# Patient Record
Sex: MALE | Race: BLACK OR AFRICAN AMERICAN | NOT HISPANIC OR LATINO | URBAN - METROPOLITAN AREA
[De-identification: names, ages, dates, MRNs, and addresses within clinical notes are randomized per-mention and may not be internally consistent; named-entity substitution may affect disease eponyms.]

---

## 2020-11-01 ENCOUNTER — EMERGENCY (EMERGENCY)
Facility: HOSPITAL | Age: 60
LOS: 0 days | Discharge: HOME | End: 2020-11-01
Attending: EMERGENCY MEDICINE | Admitting: EMERGENCY MEDICINE
Payer: MEDICAID

## 2020-11-01 VITALS
HEIGHT: 68 IN | HEART RATE: 82 BPM | OXYGEN SATURATION: 99 % | DIASTOLIC BLOOD PRESSURE: 87 MMHG | SYSTOLIC BLOOD PRESSURE: 159 MMHG | TEMPERATURE: 99 F | RESPIRATION RATE: 20 BRPM | WEIGHT: 203.93 LBS

## 2020-11-01 VITALS
TEMPERATURE: 98 F | SYSTOLIC BLOOD PRESSURE: 144 MMHG | OXYGEN SATURATION: 95 % | HEART RATE: 75 BPM | DIASTOLIC BLOOD PRESSURE: 73 MMHG | RESPIRATION RATE: 20 BRPM

## 2020-11-01 DIAGNOSIS — E78.5 HYPERLIPIDEMIA, UNSPECIFIED: ICD-10-CM

## 2020-11-01 DIAGNOSIS — F17.200 NICOTINE DEPENDENCE, UNSPECIFIED, UNCOMPLICATED: ICD-10-CM

## 2020-11-01 DIAGNOSIS — F41.9 ANXIETY DISORDER, UNSPECIFIED: ICD-10-CM

## 2020-11-01 DIAGNOSIS — I25.10 ATHEROSCLEROTIC HEART DISEASE OF NATIVE CORONARY ARTERY WITHOUT ANGINA PECTORIS: ICD-10-CM

## 2020-11-01 DIAGNOSIS — J44.1 CHRONIC OBSTRUCTIVE PULMONARY DISEASE WITH (ACUTE) EXACERBATION: ICD-10-CM

## 2020-11-01 PROCEDURE — 93010 ELECTROCARDIOGRAM REPORT: CPT

## 2020-11-01 PROCEDURE — 99285 EMERGENCY DEPT VISIT HI MDM: CPT

## 2020-11-01 PROCEDURE — 71045 X-RAY EXAM CHEST 1 VIEW: CPT | Mod: 26

## 2020-11-01 RX ORDER — IPRATROPIUM BROMIDE 0.2 MG/ML
1 SOLUTION, NON-ORAL INHALATION EVERY 6 HOURS
Refills: 0 | Status: DISCONTINUED | OUTPATIENT
Start: 2020-11-01 | End: 2020-11-01

## 2020-11-01 RX ORDER — IPRATROPIUM BROMIDE 0.2 MG/ML
1 SOLUTION, NON-ORAL INHALATION
Refills: 0 | Status: DISCONTINUED | OUTPATIENT
Start: 2020-11-01 | End: 2020-11-01

## 2020-11-01 RX ORDER — CLONAZEPAM 1 MG
0.5 TABLET ORAL ONCE
Refills: 0 | Status: DISCONTINUED | OUTPATIENT
Start: 2020-11-01 | End: 2020-11-01

## 2020-11-01 RX ORDER — ALBUTEROL 90 UG/1
2.5 AEROSOL, METERED ORAL
Refills: 0 | Status: COMPLETED | OUTPATIENT
Start: 2020-11-01 | End: 2020-11-01

## 2020-11-01 RX ORDER — IPRATROPIUM/ALBUTEROL SULFATE 18-103MCG
1 AEROSOL WITH ADAPTER (GRAM) INHALATION
Refills: 0 | Status: DISCONTINUED | OUTPATIENT
Start: 2020-11-01 | End: 2020-11-01

## 2020-11-01 RX ADMIN — Medication 1 PUFF(S): at 04:59

## 2020-11-01 RX ADMIN — ALBUTEROL 2.5 MILLIGRAM(S): 90 AEROSOL, METERED ORAL at 04:14

## 2020-11-01 RX ADMIN — Medication 0.5 MILLIGRAM(S): at 04:14

## 2020-11-01 RX ADMIN — ALBUTEROL 2.5 MILLIGRAM(S): 90 AEROSOL, METERED ORAL at 04:05

## 2020-11-01 RX ADMIN — Medication 60 MILLIGRAM(S): at 04:14

## 2020-11-01 RX ADMIN — ALBUTEROL 2.5 MILLIGRAM(S): 90 AEROSOL, METERED ORAL at 04:11

## 2020-11-01 NOTE — ED PROVIDER NOTE - DATE/TIME 1
- diabetic diet  - continue lantus and ISS, adjust as needed  - glucose is only mildly elevated, no reason to suspect DKA is the cause of his acidosis     01-Nov-2020 05:33

## 2020-11-01 NOTE — ED PROVIDER NOTE - PMH
CAD (coronary artery disease)    COPD (chronic obstructive pulmonary disease)    HLD (hyperlipidemia)

## 2020-11-01 NOTE — ED PROVIDER NOTE - ATTENDING CONTRIBUTION TO CARE
60 y.o male, PMH of COPD, HLD, CAD s/p PCI comes in c/o SOB for 2 days, ran out of his albuterol inhaler 2 days ago. SOB associated with cough. No fever/chills, CP, abdominal pain, n/v/c/d, leg pain or swelling. Pt also reports feeling anxious because group home forgot to give him his Klonopin. On exam, pt in NAD, AAOx3, head NC/AT, CN II-XII intact, lungs wheezing B/L, CV S1S2 regular, abdomen soft/NT/ND/(+)BS, ext (-) edema, motor 5/5x4, sensation intact. Will give albuterol and reevaluate.

## 2020-11-01 NOTE — ED ADULT TRIAGE NOTE - CHIEF COMPLAINT QUOTE
Pt BIBA for adult home for anxiety/ patient states they forgot to give him his anxiety medicaiton Pt BIBA for adult home for SOB and  anxiety/ patient states they forgot to give him his anxiety medicaiton

## 2020-11-01 NOTE — ED PROVIDER NOTE - PATIENT PORTAL LINK FT
You can access the FollowMyHealth Patient Portal offered by [facility] by registering at the following website: [email or web address]. By joining [employer]’s FollowMyHealth portal, you will also be able to view your health information using other applications (apps) compatible with our system.

## 2020-11-01 NOTE — ED ADULT NURSE NOTE - NSIMPLEMENTINTERV_GEN_ALL_ED
Implemented All Fall Risk Interventions:  [place] to call system. Call bell, personal items and telephone within reach. Instruct patient to call for assistance. Room bathroom lighting operational. Non-slip footwear when patient is off stretcher. Physically safe environment: no spills, clutter or unnecessary equipment. Stretcher in lowest position, wheels locked, appropriate side rails in place. Provide visual cue, wrist band, yellow gown, etc. Monitor gait and stability. Monitor for mental status changes and reorient to person, place, and time. Review medications for side effects contributing to fall risk. Reinforce activity limits and safety measures with patient and family.

## 2020-11-01 NOTE — ED PROVIDER NOTE - PHYSICAL EXAMINATION
CONSTITUTIONAL: well-appearing, in NAD  SKIN: Warm dry, normal skin turgor  HEAD: NCAT  EYES: EOMI, PERRLA, no scleral icterus, conjunctiva pink  ENT: normal pharynx with no erythema or exudates  NECK: Supple; non tender. Full ROM.  CARD: RRR, no murmurs.  RESP: b/l diffuse expiratory wheeze.  ABD: soft, non-tender, non-distended, no rebound or guarding.  EXT: Full ROM, no bony tenderness, no pedal edema, no calf tenderness  NEURO: normal motor. normal sensory.  PSYCH: Cooperative, appropriate.

## 2020-11-01 NOTE — ED ADULT NURSE NOTE - OBJECTIVE STATEMENT
Pt presents to ed c/o SOB, anxiety, and cough. Pt did not take anxiety medication and ran out of albuterol inhaler. Pt is from [facility] adult home. Pt is on 2L NC. Pt is not in any distress. Albuterol and ipratropium pump given via MDI. Clonazepam given for anxiety. 97% on 2L NC.

## 2020-11-01 NOTE — ED PROVIDER NOTE - OBJECTIVE STATEMENT
60 y.o COPD, HLD, CAD s/p PCI p/w SOB x 2 days. Pt ran out of his albuterol inhaler 2 days ago and began having SOB. + cough, no cp, no fever, no n/v  no abd pain, no leg swelling, no dizziness.

## 2020-11-01 NOTE — ED PROVIDER NOTE - NS ED ROS FT
Constitutional:  (-) fever, (-) chills, (-) lethargy  Eyes:  (-) eye pain (-) visual changes  ENMT: (-) nasal discharge, (-) sore throat. (-) neck pain or stiffness  Cardiac: (-) chest pain (-) palpitations  Respiratory:  (+) cough (+) respiratory distress.   GI:  (-) nausea (-) vomiting (-) diarrhea (-) abdominal pain.  :  (-) dysuria (-) frequency (-) burning.  MS:  (-) back pain (-) joint pain.  Neuro:  (-) headache (-) numbness (-) tingling (-) focal weakness  Skin:  (-) rash  Except as documented in the HPI,  all other systems are negative

## 2020-11-01 NOTE — ED PROVIDER NOTE - NSFOLLOWUPINSTRUCTIONS_ED_ALL_ED_FT
Chronic Obstructive Pulmonary Disease    Chronic obstructive pulmonary disease (COPD) is a lung condition in which airflow from the lungs is limited. Causes include smoking, secondhand smoke exposure, genetics, or recurrent infections. Take all medicines (inhaled or pills) as directed by your health care provider. Avoid exposure to irritants such as smoke, chemicals, and fumes that aggravate your breathing.    If you are a smoker, the most important thing that you can do is stop smoking. Continuing to smoke will cause further lung damage and breathing trouble. Ask your health care provider for help with quitting smoking.    SEEK IMMEDIATE MEDICAL CARE IF YOU HAVE ANY OF THE FOLLOWING SYMPTOMS: shortness of breath at rest or when talking, bluish discoloration of lips, skin, fever, worsening cough, unexplained chest pain, or lightheadedness/dizziness.    MIYA NEEDS TO TAKE PREDNISONE 50mg ONCE A DAY BEGINNING 11/2/2020 FOR 4 DAYS.

## 2020-11-01 NOTE — ED ADULT NURSE NOTE - CHIEF COMPLAINT QUOTE
Pt BIBA for adult home for SOB and  anxiety/ patient states they forgot to give him his anxiety medicaiton

## 2020-11-01 NOTE — ED PROVIDER NOTE - CARE PLAN
Principal Discharge DX:	COPD exacerbation   Principal Discharge DX:	COPD exacerbation  Secondary Diagnosis:	Anxiety

## 2023-04-14 ENCOUNTER — INPATIENT (INPATIENT)
Facility: HOSPITAL | Age: 63
LOS: 2 days | Discharge: NURSING, REHAB OR LONG-TERM CARE | DRG: 140 | End: 2023-04-17
Attending: HOSPITALIST | Admitting: HOSPITALIST
Payer: MEDICAID

## 2023-04-14 VITALS
HEART RATE: 98 BPM | SYSTOLIC BLOOD PRESSURE: 116 MMHG | RESPIRATION RATE: 20 BRPM | DIASTOLIC BLOOD PRESSURE: 65 MMHG | TEMPERATURE: 100 F | OXYGEN SATURATION: 97 %

## 2023-04-14 LAB
ALBUMIN SERPL ELPH-MCNC: 4.4 G/DL — SIGNIFICANT CHANGE UP (ref 3.5–5.2)
ALP SERPL-CCNC: 83 U/L — SIGNIFICANT CHANGE UP (ref 30–115)
ALT FLD-CCNC: 11 U/L — SIGNIFICANT CHANGE UP (ref 0–41)
ANION GAP SERPL CALC-SCNC: 9 MMOL/L — SIGNIFICANT CHANGE UP (ref 7–14)
AST SERPL-CCNC: 15 U/L — SIGNIFICANT CHANGE UP (ref 0–41)
BASOPHILS # BLD AUTO: 0.03 K/UL — SIGNIFICANT CHANGE UP (ref 0–0.2)
BASOPHILS NFR BLD AUTO: 0.4 % — SIGNIFICANT CHANGE UP (ref 0–1)
BILIRUB SERPL-MCNC: 0.5 MG/DL — SIGNIFICANT CHANGE UP (ref 0.2–1.2)
BUN SERPL-MCNC: 15 MG/DL — SIGNIFICANT CHANGE UP (ref 10–20)
CALCIUM SERPL-MCNC: 9.9 MG/DL — SIGNIFICANT CHANGE UP (ref 8.4–10.5)
CHLORIDE SERPL-SCNC: 96 MMOL/L — LOW (ref 98–110)
CO2 SERPL-SCNC: 31 MMOL/L — SIGNIFICANT CHANGE UP (ref 17–32)
CREAT SERPL-MCNC: 1.4 MG/DL — SIGNIFICANT CHANGE UP (ref 0.7–1.5)
EGFR: 56 ML/MIN/1.73M2 — LOW
EOSINOPHIL # BLD AUTO: 0.04 K/UL — SIGNIFICANT CHANGE UP (ref 0–0.7)
EOSINOPHIL NFR BLD AUTO: 0.5 % — SIGNIFICANT CHANGE UP (ref 0–8)
FLUAV AG NPH QL: SIGNIFICANT CHANGE UP
FLUBV AG NPH QL: SIGNIFICANT CHANGE UP
GLUCOSE SERPL-MCNC: 103 MG/DL — HIGH (ref 70–99)
HCT VFR BLD CALC: 40.7 % — LOW (ref 42–52)
HGB BLD-MCNC: 13.8 G/DL — LOW (ref 14–18)
IMM GRANULOCYTES NFR BLD AUTO: 0.1 % — SIGNIFICANT CHANGE UP (ref 0.1–0.3)
LYMPHOCYTES # BLD AUTO: 2.87 K/UL — SIGNIFICANT CHANGE UP (ref 1.2–3.4)
LYMPHOCYTES # BLD AUTO: 36.9 % — SIGNIFICANT CHANGE UP (ref 20.5–51.1)
MCHC RBC-ENTMCNC: 30.3 PG — SIGNIFICANT CHANGE UP (ref 27–31)
MCHC RBC-ENTMCNC: 33.9 G/DL — SIGNIFICANT CHANGE UP (ref 32–37)
MCV RBC AUTO: 89.5 FL — SIGNIFICANT CHANGE UP (ref 80–94)
MONOCYTES # BLD AUTO: 0.72 K/UL — HIGH (ref 0.1–0.6)
MONOCYTES NFR BLD AUTO: 9.3 % — SIGNIFICANT CHANGE UP (ref 1.7–9.3)
NEUTROPHILS # BLD AUTO: 4.1 K/UL — SIGNIFICANT CHANGE UP (ref 1.4–6.5)
NEUTROPHILS NFR BLD AUTO: 52.8 % — SIGNIFICANT CHANGE UP (ref 42.2–75.2)
NRBC # BLD: 0 /100 WBCS — SIGNIFICANT CHANGE UP (ref 0–0)
PLATELET # BLD AUTO: 195 K/UL — SIGNIFICANT CHANGE UP (ref 130–400)
PMV BLD: 10.9 FL — HIGH (ref 7.4–10.4)
POTASSIUM SERPL-MCNC: 3.7 MMOL/L — SIGNIFICANT CHANGE UP (ref 3.5–5)
POTASSIUM SERPL-SCNC: 3.7 MMOL/L — SIGNIFICANT CHANGE UP (ref 3.5–5)
PROT SERPL-MCNC: 7.2 G/DL — SIGNIFICANT CHANGE UP (ref 6–8)
RBC # BLD: 4.55 M/UL — LOW (ref 4.7–6.1)
RBC # FLD: 12.6 % — SIGNIFICANT CHANGE UP (ref 11.5–14.5)
RSV RNA NPH QL NAA+NON-PROBE: SIGNIFICANT CHANGE UP
SARS-COV-2 RNA SPEC QL NAA+PROBE: SIGNIFICANT CHANGE UP
SODIUM SERPL-SCNC: 136 MMOL/L — SIGNIFICANT CHANGE UP (ref 135–146)
WBC # BLD: 7.77 K/UL — SIGNIFICANT CHANGE UP (ref 4.8–10.8)
WBC # FLD AUTO: 7.77 K/UL — SIGNIFICANT CHANGE UP (ref 4.8–10.8)

## 2023-04-14 PROCEDURE — 71045 X-RAY EXAM CHEST 1 VIEW: CPT | Mod: 26

## 2023-04-14 PROCEDURE — 99285 EMERGENCY DEPT VISIT HI MDM: CPT

## 2023-04-14 RX ORDER — IPRATROPIUM/ALBUTEROL SULFATE 18-103MCG
3 AEROSOL WITH ADAPTER (GRAM) INHALATION
Refills: 0 | Status: COMPLETED | OUTPATIENT
Start: 2023-04-14 | End: 2023-04-14

## 2023-04-14 RX ORDER — ACETAMINOPHEN 500 MG
650 TABLET ORAL ONCE
Refills: 0 | Status: COMPLETED | OUTPATIENT
Start: 2023-04-14 | End: 2023-04-14

## 2023-04-14 RX ADMIN — Medication 3 MILLILITER(S): at 23:00

## 2023-04-14 RX ADMIN — Medication 125 MILLIGRAM(S): at 22:45

## 2023-04-14 RX ADMIN — Medication 3 MILLILITER(S): at 22:23

## 2023-04-14 RX ADMIN — Medication 650 MILLIGRAM(S): at 23:00

## 2023-04-14 RX ADMIN — Medication 650 MILLIGRAM(S): at 22:22

## 2023-04-14 RX ADMIN — Medication 3 MILLILITER(S): at 22:45

## 2023-04-14 RX ADMIN — Medication 3 MILLILITER(S): at 23:32

## 2023-04-14 NOTE — ED ADULT NURSE NOTE - OBJECTIVE STATEMENT
Patient AOx3, BIBA from NH, c/o weakness, sob, congestion, difficulty breathing and loss of appetite.

## 2023-04-14 NOTE — ED ADULT TRIAGE NOTE - CHIEF COMPLAINT QUOTE
Patient sent from assisted living facility in NAD a+ox3 for generalized weakness and SOB X few days. As per EMS on arrival pt was on floor next to bed pt reports slipped off bed- denies hitting head, LOC, AC use

## 2023-04-14 NOTE — ED ADULT NURSE NOTE - NSSUHOSCREENINGYN_ED_ALL_ED
NURSING PROGRESS NOTE: Patient ambulated to ACC at 1320 for Port Flush.  Right upper chest Power Port identified  Per arm band and palpation points.  Accessed  & flushed per policy without incident.  Declined AVS states next appointment available per My Chart.  Discharged home at 1350.  OSMEL Landry   Yes - the patient is able to be screened

## 2023-04-14 NOTE — ED PROVIDER NOTE - NS ED ATTENDING STATEMENT MOD
This was a shared visit with the GOLD. I reviewed and verified the documentation and independently performed the documented:
abnormal

## 2023-04-14 NOTE — ED PROVIDER NOTE - CLINICAL SUMMARY MEDICAL DECISION MAKING FREE TEXT BOX
Patient presented with complaints of generalized weakness.  Was found to be febrile in the ED.  Required EKG, labs imaging, meds.  Will be admitted for further management.

## 2023-04-14 NOTE — ED PROVIDER NOTE - ATTENDING APP SHARED VISIT CONTRIBUTION OF CARE
I personally evaluated the patient. I reviewed the Resident’s or Physician Assistant’s note (as assigned above), and agree with the findings and plan except as documented in my note.  62-year-old male with past medical history of COPD not on home O2, HLD, CAD status post PCI, anemia, bipolar disorder, GERD, gout presents with complaints of several days of generalized weakness, fever and associated shortness of breath.  Patient denies chest pain, nausea, vomiting, dizziness or LOC.  VSS, non toxic appearing, NAD, Head NCAT, MMM, neck supple, normal ROM, no JVD, normal s1s2, lungs with mild expiratory wheezes diffusely with normal work of breathing, abd s/nt/nd, no guarding or rebound, extremities wnl, AAO x 3, GCS 15, neuro grossly normal. No acute skin lesions. Plan is EKG, labs, imaging, meds and reassess.

## 2023-04-14 NOTE — ED PROVIDER NOTE - OBJECTIVE STATEMENT
62 year old male with pmhx, hld, gerd, gout, anemia, bipolar, and cad with stents presents to ed with fever, weakness, and sob x 2 days. Pt denies chest pain, abd, pain, nausea, vomiting, diarrhea, sick contacts or recent travel.

## 2023-04-14 NOTE — ED PROVIDER NOTE - PHYSICAL EXAMINATION
CONST: Well appearing in NAD  EYES: PERRL, EOMI, Sclera and conjunctiva clear.   ENT: No nasal discharge. Oropharynx normal appearing  NECK: Non-tender, no meningeal signs. normal ROM. supple   CARD: Normal S1 S2; Normal rate and rhythm  RESP: Equal BS B/L, wheezing bilaterally, No distress  GI: Soft, non-tender, non-distended. no cva tenderness. normal BS  MS: Normal ROM in all extremities. No midline spinal tenderness. pulses 2 +. no calf tenderness or swelling  SKIN: Warm, dry, no acute rashes. Good turgor  NEURO: A&Ox3, No focal deficits.

## 2023-04-15 DIAGNOSIS — J44.1 CHRONIC OBSTRUCTIVE PULMONARY DISEASE WITH (ACUTE) EXACERBATION: ICD-10-CM

## 2023-04-15 PROBLEM — E78.5 HYPERLIPIDEMIA, UNSPECIFIED: Chronic | Status: ACTIVE | Noted: 2020-11-01

## 2023-04-15 PROBLEM — J44.9 CHRONIC OBSTRUCTIVE PULMONARY DISEASE, UNSPECIFIED: Chronic | Status: ACTIVE | Noted: 2020-11-01

## 2023-04-15 PROBLEM — I25.10 ATHEROSCLEROTIC HEART DISEASE OF NATIVE CORONARY ARTERY WITHOUT ANGINA PECTORIS: Chronic | Status: ACTIVE | Noted: 2020-11-01

## 2023-04-15 LAB
APPEARANCE UR: CLEAR — SIGNIFICANT CHANGE UP
BASE EXCESS BLDV CALC-SCNC: 5.9 MMOL/L — HIGH (ref -2–3)
BILIRUB UR-MCNC: NEGATIVE — SIGNIFICANT CHANGE UP
CA-I SERPL-SCNC: 1.23 MMOL/L — SIGNIFICANT CHANGE UP (ref 1.15–1.33)
CO2 BLDV-SCNC: 34.7 MMOL/L — HIGH (ref 22–26)
COLOR SPEC: SIGNIFICANT CHANGE UP
DIFF PNL FLD: NEGATIVE — SIGNIFICANT CHANGE UP
GAS PNL BLDV: 133 MMOL/L — LOW (ref 136–145)
GAS PNL BLDV: SIGNIFICANT CHANGE UP
GAS PNL BLDV: SIGNIFICANT CHANGE UP
GLUCOSE UR QL: NEGATIVE — SIGNIFICANT CHANGE UP
HCT VFR BLDA CALC: 42 % — SIGNIFICANT CHANGE UP (ref 39–51)
HCV AB S/CO SERPL IA: 0.1 S/CO — SIGNIFICANT CHANGE UP (ref 0–0.99)
HCV AB SERPL-IMP: SIGNIFICANT CHANGE UP
HGB BLD CALC-MCNC: 14 G/DL — SIGNIFICANT CHANGE UP (ref 12.6–17.4)
KETONES UR-MCNC: NEGATIVE — SIGNIFICANT CHANGE UP
LACTATE BLDV-MCNC: 1.1 MMOL/L — SIGNIFICANT CHANGE UP (ref 0.5–2)
LEUKOCYTE ESTERASE UR-ACNC: NEGATIVE — SIGNIFICANT CHANGE UP
MRSA PCR RESULT.: NEGATIVE — SIGNIFICANT CHANGE UP
NITRITE UR-MCNC: NEGATIVE — SIGNIFICANT CHANGE UP
PCO2 BLDV: 57 MMHG — HIGH (ref 42–55)
PH BLDV: 7.37 — SIGNIFICANT CHANGE UP (ref 7.32–7.43)
PH UR: 8 — SIGNIFICANT CHANGE UP (ref 5–8)
PO2 BLDV: 55 MMHG — SIGNIFICANT CHANGE UP
POTASSIUM BLDV-SCNC: 3.4 MMOL/L — LOW (ref 3.5–5.1)
PROT UR-MCNC: NEGATIVE — SIGNIFICANT CHANGE UP
SP GR SPEC: 1.01 — SIGNIFICANT CHANGE UP (ref 1.01–1.03)
TROPONIN T SERPL-MCNC: 0.04 NG/ML — CRITICAL HIGH
TROPONIN T SERPL-MCNC: <0.01 NG/ML — SIGNIFICANT CHANGE UP
UROBILINOGEN FLD QL: SIGNIFICANT CHANGE UP

## 2023-04-15 PROCEDURE — 83735 ASSAY OF MAGNESIUM: CPT

## 2023-04-15 PROCEDURE — 87086 URINE CULTURE/COLONY COUNT: CPT

## 2023-04-15 PROCEDURE — 83036 HEMOGLOBIN GLYCOSYLATED A1C: CPT

## 2023-04-15 PROCEDURE — 80061 LIPID PANEL: CPT

## 2023-04-15 PROCEDURE — 80053 COMPREHEN METABOLIC PANEL: CPT

## 2023-04-15 PROCEDURE — 93306 TTE W/DOPPLER COMPLETE: CPT | Mod: 26

## 2023-04-15 PROCEDURE — 36415 COLL VENOUS BLD VENIPUNCTURE: CPT

## 2023-04-15 PROCEDURE — 87640 STAPH A DNA AMP PROBE: CPT

## 2023-04-15 PROCEDURE — 0225U NFCT DS DNA&RNA 21 SARSCOV2: CPT

## 2023-04-15 PROCEDURE — 97162 PT EVAL MOD COMPLEX 30 MIN: CPT | Mod: GP

## 2023-04-15 PROCEDURE — 85025 COMPLETE CBC W/AUTO DIFF WBC: CPT

## 2023-04-15 PROCEDURE — 86803 HEPATITIS C AB TEST: CPT

## 2023-04-15 PROCEDURE — 84443 ASSAY THYROID STIM HORMONE: CPT

## 2023-04-15 PROCEDURE — 99223 1ST HOSP IP/OBS HIGH 75: CPT

## 2023-04-15 PROCEDURE — 81003 URINALYSIS AUTO W/O SCOPE: CPT

## 2023-04-15 PROCEDURE — 93306 TTE W/DOPPLER COMPLETE: CPT

## 2023-04-15 PROCEDURE — 84484 ASSAY OF TROPONIN QUANT: CPT

## 2023-04-15 PROCEDURE — 87641 MR-STAPH DNA AMP PROBE: CPT

## 2023-04-15 RX ORDER — OXYBUTYNIN CHLORIDE 5 MG
1 TABLET ORAL
Refills: 0 | DISCHARGE

## 2023-04-15 RX ORDER — ACETAMINOPHEN 500 MG
650 TABLET ORAL EVERY 6 HOURS
Refills: 0 | Status: ACTIVE | OUTPATIENT
Start: 2023-04-15 | End: 2024-03-13

## 2023-04-15 RX ORDER — OMEGA-3 ACID ETHYL ESTERS 1 G
1 CAPSULE ORAL
Refills: 0 | DISCHARGE

## 2023-04-15 RX ORDER — OXYCODONE AND ACETAMINOPHEN 5; 325 MG/1; MG/1
1 TABLET ORAL
Refills: 0 | DISCHARGE

## 2023-04-15 RX ORDER — RISPERIDONE 4 MG/1
1 TABLET ORAL
Refills: 0 | DISCHARGE

## 2023-04-15 RX ORDER — CLONAZEPAM 1 MG
1 TABLET ORAL
Refills: 0 | Status: ACTIVE | OUTPATIENT
Start: 2023-04-15 | End: 2023-04-22

## 2023-04-15 RX ORDER — AZITHROMYCIN 500 MG/1
500 TABLET, FILM COATED ORAL ONCE
Refills: 0 | Status: COMPLETED | OUTPATIENT
Start: 2023-04-15 | End: 2023-04-15

## 2023-04-15 RX ORDER — HEPARIN SODIUM 5000 [USP'U]/ML
5000 INJECTION INTRAVENOUS; SUBCUTANEOUS EVERY 8 HOURS
Refills: 0 | Status: ACTIVE | OUTPATIENT
Start: 2023-04-15 | End: 2024-03-13

## 2023-04-15 RX ORDER — LANOLIN ALCOHOL/MO/W.PET/CERES
3 CREAM (GRAM) TOPICAL AT BEDTIME
Refills: 0 | Status: ACTIVE | OUTPATIENT
Start: 2023-04-15 | End: 2024-03-13

## 2023-04-15 RX ORDER — METFORMIN HYDROCHLORIDE 850 MG/1
0.5 TABLET ORAL
Refills: 0 | DISCHARGE

## 2023-04-15 RX ORDER — FINASTERIDE 5 MG/1
1 TABLET, FILM COATED ORAL
Refills: 0 | DISCHARGE

## 2023-04-15 RX ORDER — ASPIRIN/CALCIUM CARB/MAGNESIUM 324 MG
1 TABLET ORAL
Refills: 0 | DISCHARGE

## 2023-04-15 RX ORDER — ATORVASTATIN CALCIUM 80 MG/1
1 TABLET, FILM COATED ORAL
Refills: 0 | DISCHARGE

## 2023-04-15 RX ORDER — TAMSULOSIN HYDROCHLORIDE 0.4 MG/1
1 CAPSULE ORAL
Refills: 0 | DISCHARGE

## 2023-04-15 RX ORDER — TRIAMTERENE/HYDROCHLOROTHIAZID 75 MG-50MG
1 TABLET ORAL DAILY
Refills: 0 | Status: ACTIVE | OUTPATIENT
Start: 2023-04-15 | End: 2024-03-13

## 2023-04-15 RX ORDER — TAMSULOSIN HYDROCHLORIDE 0.4 MG/1
0.4 CAPSULE ORAL AT BEDTIME
Refills: 0 | Status: ACTIVE | OUTPATIENT
Start: 2023-04-15 | End: 2024-03-13

## 2023-04-15 RX ORDER — FOLIC ACID 0.8 MG
1 TABLET ORAL
Refills: 0 | DISCHARGE

## 2023-04-15 RX ORDER — ONDANSETRON 8 MG/1
4 TABLET, FILM COATED ORAL EVERY 8 HOURS
Refills: 0 | Status: ACTIVE | OUTPATIENT
Start: 2023-04-15 | End: 2024-03-13

## 2023-04-15 RX ORDER — ALENDRONATE SODIUM 70 MG/1
1 TABLET ORAL
Refills: 0 | DISCHARGE

## 2023-04-15 RX ORDER — SENNA PLUS 8.6 MG/1
2 TABLET ORAL
Refills: 0 | DISCHARGE

## 2023-04-15 RX ORDER — AZITHROMYCIN 500 MG/1
500 TABLET, FILM COATED ORAL DAILY
Refills: 0 | Status: DISCONTINUED | OUTPATIENT
Start: 2023-04-15 | End: 2023-04-16

## 2023-04-15 RX ORDER — ASPIRIN/CALCIUM CARB/MAGNESIUM 324 MG
81 TABLET ORAL DAILY
Refills: 0 | Status: ACTIVE | OUTPATIENT
Start: 2023-04-15 | End: 2024-03-13

## 2023-04-15 RX ORDER — FOLIC ACID 0.8 MG
1 TABLET ORAL DAILY
Refills: 0 | Status: ACTIVE | OUTPATIENT
Start: 2023-04-15 | End: 2024-03-13

## 2023-04-15 RX ORDER — METOPROLOL TARTRATE 50 MG
50 TABLET ORAL DAILY
Refills: 0 | Status: ACTIVE | OUTPATIENT
Start: 2023-04-15 | End: 2024-03-13

## 2023-04-15 RX ORDER — BNT162B2 0.23 MG/2.25ML
0.3 INJECTION, SUSPENSION INTRAMUSCULAR ONCE
Refills: 0 | Status: ACTIVE | OUTPATIENT
Start: 2023-04-15 | End: 2024-03-13

## 2023-04-15 RX ORDER — CLONAZEPAM 1 MG
1 TABLET ORAL
Refills: 0 | DISCHARGE

## 2023-04-15 RX ORDER — POLYETHYLENE GLYCOL 3350 17 G/17G
17 POWDER, FOR SOLUTION ORAL DAILY
Refills: 0 | Status: ACTIVE | OUTPATIENT
Start: 2023-04-15 | End: 2024-03-13

## 2023-04-15 RX ORDER — FINASTERIDE 5 MG/1
5 TABLET, FILM COATED ORAL DAILY
Refills: 0 | Status: ACTIVE | OUTPATIENT
Start: 2023-04-15 | End: 2024-03-13

## 2023-04-15 RX ORDER — OXYBUTYNIN CHLORIDE 5 MG
5 TABLET ORAL
Refills: 0 | Status: ACTIVE | OUTPATIENT
Start: 2023-04-15 | End: 2024-03-13

## 2023-04-15 RX ORDER — FENOFIBRATE,MICRONIZED 130 MG
1 CAPSULE ORAL
Refills: 0 | DISCHARGE

## 2023-04-15 RX ORDER — RISPERIDONE 4 MG/1
2 TABLET ORAL
Refills: 0 | Status: ACTIVE | OUTPATIENT
Start: 2023-04-15 | End: 2024-03-13

## 2023-04-15 RX ORDER — ATORVASTATIN CALCIUM 80 MG/1
20 TABLET, FILM COATED ORAL AT BEDTIME
Refills: 0 | Status: DISCONTINUED | OUTPATIENT
Start: 2023-04-15 | End: 2023-04-16

## 2023-04-15 RX ORDER — FENOFIBRATE,MICRONIZED 130 MG
48 CAPSULE ORAL DAILY
Refills: 0 | Status: ACTIVE | OUTPATIENT
Start: 2023-04-15 | End: 2024-03-13

## 2023-04-15 RX ORDER — POLYETHYLENE GLYCOL 3350 17 G/17G
17 POWDER, FOR SOLUTION ORAL
Refills: 0 | DISCHARGE

## 2023-04-15 RX ORDER — TRIAMTERENE/HYDROCHLOROTHIAZID 75 MG-50MG
1 TABLET ORAL
Refills: 0 | DISCHARGE

## 2023-04-15 RX ORDER — METOPROLOL TARTRATE 50 MG
1 TABLET ORAL
Refills: 0 | DISCHARGE

## 2023-04-15 RX ADMIN — Medication 10 MILLIGRAM(S): at 13:21

## 2023-04-15 RX ADMIN — Medication 1 TABLET(S): at 05:33

## 2023-04-15 RX ADMIN — AZITHROMYCIN 500 MILLIGRAM(S): 500 TABLET, FILM COATED ORAL at 13:21

## 2023-04-15 RX ADMIN — Medication 50 MILLIGRAM(S): at 05:33

## 2023-04-15 RX ADMIN — FINASTERIDE 5 MILLIGRAM(S): 5 TABLET, FILM COATED ORAL at 13:26

## 2023-04-15 RX ADMIN — RISPERIDONE 2 MILLIGRAM(S): 4 TABLET ORAL at 17:33

## 2023-04-15 RX ADMIN — HEPARIN SODIUM 5000 UNIT(S): 5000 INJECTION INTRAVENOUS; SUBCUTANEOUS at 05:34

## 2023-04-15 RX ADMIN — Medication 81 MILLIGRAM(S): at 13:24

## 2023-04-15 RX ADMIN — HEPARIN SODIUM 5000 UNIT(S): 5000 INJECTION INTRAVENOUS; SUBCUTANEOUS at 21:31

## 2023-04-15 RX ADMIN — HEPARIN SODIUM 5000 UNIT(S): 5000 INJECTION INTRAVENOUS; SUBCUTANEOUS at 13:27

## 2023-04-15 RX ADMIN — Medication 48 MILLIGRAM(S): at 13:22

## 2023-04-15 RX ADMIN — ATORVASTATIN CALCIUM 20 MILLIGRAM(S): 80 TABLET, FILM COATED ORAL at 21:32

## 2023-04-15 RX ADMIN — Medication 25 MILLIGRAM(S): at 05:32

## 2023-04-15 RX ADMIN — AZITHROMYCIN 255 MILLIGRAM(S): 500 TABLET, FILM COATED ORAL at 01:44

## 2023-04-15 RX ADMIN — Medication 25 MILLIGRAM(S): at 17:33

## 2023-04-15 RX ADMIN — Medication 5 MILLIGRAM(S): at 17:33

## 2023-04-15 RX ADMIN — Medication 40 MILLIGRAM(S): at 13:21

## 2023-04-15 RX ADMIN — Medication 5 MILLIGRAM(S): at 05:33

## 2023-04-15 RX ADMIN — Medication 1 MILLIGRAM(S): at 13:24

## 2023-04-15 RX ADMIN — TAMSULOSIN HYDROCHLORIDE 0.4 MILLIGRAM(S): 0.4 CAPSULE ORAL at 21:32

## 2023-04-15 RX ADMIN — RISPERIDONE 2 MILLIGRAM(S): 4 TABLET ORAL at 05:32

## 2023-04-15 NOTE — H&P ADULT - NSICDXPASTMEDICALHX_GEN_ALL_CORE_FT
PAST MEDICAL HISTORY:  CAD (coronary artery disease)     COPD (chronic obstructive pulmonary disease)     HLD (hyperlipidemia)

## 2023-04-15 NOTE — H&P ADULT - ATTENDING COMMENTS
62-year-old male with past medical history of COPD not on home O2, HLD, CAD status post PCI, anemia, bipolar disorder, GERD, gout presents with complaints of several days of generalized weakness. He stated that he started to feel short of breath on exertion about 2 days ago and reports subjective fever.    #Acute Hypoxic Respiratory Failure, resolved  #Mild COPD exacerbation  #Suspected viral illness  #Weakness  Pt symptoms have resolved after getting dose of IV steroids overnight + duonebs  Initially was on 3L O2 NC on admission, but now on RA saturating 95%  - Cont PO prednisone 40mg qD x5 more days  - Cont duonebs for now  - Cont azithromycin 500 x3d  - PT Eval  - DC Planning, pt is from [facility] and they are able to accept pt back on monday    #HTN/HLD  #CAD sp PCI  #Elevated troponins, likely demand ischemia  - Monitor trop  - Cont lipitor 20mg qHs  - Cont fenofibrate 48 qD  - Cont metoprolol ER 50mg qD  - Cont triamterene-HCTZ 37.5-25 qD    #Bipolar disorder  - Cont risperdal 2 BID  - Cont paxil 10mg qD    #Back pain  - On percocet and lyrica    #BPH  - Cont flomax and finasteride    DVT PPX heparin    #Progress Note Handoff  Pending (specify): DC on Monday  Family discussion: dw pt regarding tx for dyspnea  Disposition: [facility] ( [facility])

## 2023-04-15 NOTE — H&P ADULT - NSHPPHYSICALEXAM_GEN_ALL_CORE
GENERAL: NAD, speaks in full sentences, no signs of respiratory distress  HEAD:  Atraumatic, Normocephalic  EYES: EOMI, PERRLA, anicteric sclera  NECK: Supple, No JVD  CHEST/LUNG: Clear to auscultation bilaterally; No wheeze; No crackles; No accessory muscles used  HEART: Regular rate and rhythm; No murmurs;   ABDOMEN: Soft, Nontender, Nondistended; Bowel sounds present; No guarding  EXTREMITIES:  2+ Peripheral Pulses, No cyanosis or edema  PSYCH: AAOx3  NEUROLOGY: non-focal  SKIN: No rashes or lesions GENERAL: NAD, speaks in full sentences, no signs of respiratory distress  HEAD:  Atraumatic, Normocephalic  EYES: EOMI, PERRLA, anicteric sclera  NECK: Supple, No JVD  CHEST/LUNG: Clear to auscultation bilaterally; No wheeze; No accessory muscles used  HEART: Regular rate and rhythm; No murmurs;   ABDOMEN: Soft, Nontender, Nondistended; Bowel sounds present; No guarding  EXTREMITIES:  2+ Peripheral Pulses, No cyanosis or edema  PSYCH: AAOx3  NEUROLOGY: non-focal  SKIN: No rashes or lesions

## 2023-04-15 NOTE — PATIENT PROFILE ADULT - FALL HARM RISK - HARM RISK INTERVENTIONS

## 2023-04-15 NOTE — H&P ADULT - ASSESSMENT
62-year-old male with past medical history of COPD not on home O2, HLD, CAD status post PCI, anemia, bipolar disorder, GERD, gout presents with complaints of several days of generalized weakness, fever and associated shortness of breath.  Patient denies chest pain, nausea, vomiting, dizziness, loc, sick contact or recent travel     In the ED, /65, HR 98, RR 20, T 100.1, SpO2 97% on 3L NC . EKG showed NSR. CXR unremarkable. Labs were significant for Hgb 13.8, Trop 0.04. Patient received azithromycin x1, duoneb and solumedrol 125 x1    Patient is being admitted     #Generalized Weakness    #Acute hypoxemic Respiratory Failure 2/2 COPD exacerbation  - CXR (4.15.23): unremarkable   - Pulse ox q8 hrs and Nebs q 6 hrs PRN  - s/p Solumedrol 125mg IV then 40 q 6 with plan for taper  - Supplemental Oxygen PRN, titrate PRN to wean patient to baseline O2 status. COPD patient will keep SPO2 goal 88-92% .   On home oxygen ?  f/u MRSA PCR, RVP, Sputum cx  Pulmonary consult    62-year-old male with past medical history of COPD not on home O2, HLD, CAD status post PCI, anemia, bipolar disorder, GERD, gout presents with complaints of several days of generalized weakness. He stated that he started to feel short of breath on exertion about 2 days ago and reports subjective fever. This prompted his presentation tot he ED.   Patient denies chest pain, nausea, cough, vomiting, dizziness, loc, sick contact or recent travel. He ambulates with a walker and lives in assisted living. He quit smoking a month ago.     In the ED, /65, HR 98, RR 20, T 100.1, SpO2 97% on 3L NC . EKG showed NSR. CXR unremarkable. Labs were significant for Hgb 13.8, Trop 0.04. Patient received azithromycin x1, duoneb and solumedrol 125 x1    #Generalized Weakness  #SIRS on admission  - febrile, no leukocytosis   - CXR unremarkable.  - No signs fluid overload on PE  - f/u UA, UCx, Blood Cx, TSH   - obtain PT/OT   - Electrolyte wnl, replete as needed    #Acute hypoxemic Respiratory Failure 2/2 suspected COPD exacerbation  - not on home oxygen   - CXR (4.15.23): unremarkable   - Pulse ox q8 hrs and Nebs q 6 hrs PRN  - s/p Solumedrol 125mg IV then 40 q 6 with plan for taper  - start azithromycin  - Supplemental Oxygen PRN, titrate PRN to wean patient to baseline O2 status. COPD patient will keep SPO2 goal 88-92% .   - f/u MRSA PCR, RVP, Sputum cx    #Troponemia likely 2/2 demand ischemia   - Tropnin 0.04  - no active chest pain. EKG NSR   - trend troponin and check TTE    #CAD status post PCI  #HTN  # HLD  - c/w home meds     #bipolar disorder  - c/w home meds     #BPH  -c/w home meds     #GERD  - c/w home meds

## 2023-04-15 NOTE — H&P ADULT - HISTORY OF PRESENT ILLNESS
62-year-old male with past medical history of COPD not on home O2, HLD, CAD status post PCI, anemia, bipolar disorder, GERD, gout presents with complaints of several days of generalized weakness, fever and associated shortness of breath.  Patient denies chest pain, nausea, vomiting, dizziness, loc, sick contact or recent travel     In the ED, /65, HR 98, RR 20, T 100.1, SpO2 97% on 3L NC . EKG showed NSR. CXR unremarkable. Labs were significant for Hgb 13.8, Trop 0.04. Patient received azithromycin x1, duoneb and solumedrol 125 x1    Patient is being admitted for further monitoring and management 62-year-old male with past medical history of COPD not on home O2, HLD, CAD status post PCI, anemia, bipolar disorder, GERD, gout presents with complaints of several days of generalized weakness. He stated that he started to feel short of breath on exertion about 2 days ago and reports subjective fever. This prompted his presentation tot he ED.   Patient denies chest pain, nausea, cough, vomiting, dizziness, loc, sick contact or recent travel. He ambulates with a walker and lives in assisted living. He quit smoking a month ago.     In the ED, /65, HR 98, RR 20, T 100.1, SpO2 97% on 3L NC . EKG showed NSR. CXR unremarkable. Labs were significant for Hgb 13.8, Trop 0.04. Patient received azithromycin x1, duoneb and solumedrol 125 x1    Patient is being admitted for further monitoring and management

## 2023-04-16 LAB
ALBUMIN SERPL ELPH-MCNC: 4.2 G/DL — SIGNIFICANT CHANGE UP (ref 3.5–5.2)
ALP SERPL-CCNC: 80 U/L — SIGNIFICANT CHANGE UP (ref 30–115)
ALT FLD-CCNC: 19 U/L — SIGNIFICANT CHANGE UP (ref 0–41)
ANION GAP SERPL CALC-SCNC: 10 MMOL/L — SIGNIFICANT CHANGE UP (ref 7–14)
AST SERPL-CCNC: 24 U/L — SIGNIFICANT CHANGE UP (ref 0–41)
BASOPHILS # BLD AUTO: 0.01 K/UL — SIGNIFICANT CHANGE UP (ref 0–0.2)
BASOPHILS NFR BLD AUTO: 0.1 % — SIGNIFICANT CHANGE UP (ref 0–1)
BILIRUB SERPL-MCNC: 0.5 MG/DL — SIGNIFICANT CHANGE UP (ref 0.2–1.2)
BUN SERPL-MCNC: 19 MG/DL — SIGNIFICANT CHANGE UP (ref 10–20)
CALCIUM SERPL-MCNC: 9.5 MG/DL — SIGNIFICANT CHANGE UP (ref 8.4–10.5)
CHLORIDE SERPL-SCNC: 100 MMOL/L — SIGNIFICANT CHANGE UP (ref 98–110)
CHOLEST SERPL-MCNC: 150 MG/DL — SIGNIFICANT CHANGE UP
CO2 SERPL-SCNC: 30 MMOL/L — SIGNIFICANT CHANGE UP (ref 17–32)
CREAT SERPL-MCNC: 1.1 MG/DL — SIGNIFICANT CHANGE UP (ref 0.7–1.5)
EGFR: 75 ML/MIN/1.73M2 — SIGNIFICANT CHANGE UP
EOSINOPHIL # BLD AUTO: 0 K/UL — SIGNIFICANT CHANGE UP (ref 0–0.7)
EOSINOPHIL NFR BLD AUTO: 0 % — SIGNIFICANT CHANGE UP (ref 0–8)
GLUCOSE SERPL-MCNC: 120 MG/DL — HIGH (ref 70–99)
HCT VFR BLD CALC: 42 % — SIGNIFICANT CHANGE UP (ref 42–52)
HDLC SERPL-MCNC: 40 MG/DL — LOW
HGB BLD-MCNC: 13.9 G/DL — LOW (ref 14–18)
IMM GRANULOCYTES NFR BLD AUTO: 0.5 % — HIGH (ref 0.1–0.3)
LIPID PNL WITH DIRECT LDL SERPL: 84 MG/DL — SIGNIFICANT CHANGE UP
LYMPHOCYTES # BLD AUTO: 1.57 K/UL — SIGNIFICANT CHANGE UP (ref 1.2–3.4)
LYMPHOCYTES # BLD AUTO: 11.7 % — LOW (ref 20.5–51.1)
MAGNESIUM SERPL-MCNC: 2.1 MG/DL — SIGNIFICANT CHANGE UP (ref 1.8–2.4)
MCHC RBC-ENTMCNC: 29.5 PG — SIGNIFICANT CHANGE UP (ref 27–31)
MCHC RBC-ENTMCNC: 33.1 G/DL — SIGNIFICANT CHANGE UP (ref 32–37)
MCV RBC AUTO: 89.2 FL — SIGNIFICANT CHANGE UP (ref 80–94)
MONOCYTES # BLD AUTO: 1.05 K/UL — HIGH (ref 0.1–0.6)
MONOCYTES NFR BLD AUTO: 7.8 % — SIGNIFICANT CHANGE UP (ref 1.7–9.3)
NEUTROPHILS # BLD AUTO: 10.75 K/UL — HIGH (ref 1.4–6.5)
NEUTROPHILS NFR BLD AUTO: 79.9 % — HIGH (ref 42.2–75.2)
NON HDL CHOLESTEROL: 110 MG/DL — SIGNIFICANT CHANGE UP
NRBC # BLD: 0 /100 WBCS — SIGNIFICANT CHANGE UP (ref 0–0)
PLATELET # BLD AUTO: 194 K/UL — SIGNIFICANT CHANGE UP (ref 130–400)
PMV BLD: 11.4 FL — HIGH (ref 7.4–10.4)
POTASSIUM SERPL-MCNC: 4.3 MMOL/L — SIGNIFICANT CHANGE UP (ref 3.5–5)
POTASSIUM SERPL-SCNC: 4.3 MMOL/L — SIGNIFICANT CHANGE UP (ref 3.5–5)
PROT SERPL-MCNC: 7 G/DL — SIGNIFICANT CHANGE UP (ref 6–8)
RAPID RVP RESULT: SIGNIFICANT CHANGE UP
RBC # BLD: 4.71 M/UL — SIGNIFICANT CHANGE UP (ref 4.7–6.1)
RBC # FLD: 12.6 % — SIGNIFICANT CHANGE UP (ref 11.5–14.5)
SARS-COV-2 RNA SPEC QL NAA+PROBE: SIGNIFICANT CHANGE UP
SODIUM SERPL-SCNC: 140 MMOL/L — SIGNIFICANT CHANGE UP (ref 135–146)
TRIGL SERPL-MCNC: 127 MG/DL — SIGNIFICANT CHANGE UP
WBC # BLD: 13.45 K/UL — HIGH (ref 4.8–10.8)
WBC # FLD AUTO: 13.45 K/UL — HIGH (ref 4.8–10.8)

## 2023-04-16 PROCEDURE — 99233 SBSQ HOSP IP/OBS HIGH 50: CPT

## 2023-04-16 RX ORDER — ALBUTEROL 90 UG/1
1 AEROSOL, METERED ORAL EVERY 6 HOURS
Refills: 0 | Status: ACTIVE | OUTPATIENT
Start: 2023-04-16 | End: 2024-03-14

## 2023-04-16 RX ORDER — ATORVASTATIN CALCIUM 80 MG/1
40 TABLET, FILM COATED ORAL AT BEDTIME
Refills: 0 | Status: ACTIVE | OUTPATIENT
Start: 2023-04-16 | End: 2024-03-14

## 2023-04-16 RX ORDER — BUDESONIDE AND FORMOTEROL FUMARATE DIHYDRATE 160; 4.5 UG/1; UG/1
2 AEROSOL RESPIRATORY (INHALATION)
Refills: 0 | Status: ACTIVE | OUTPATIENT
Start: 2023-04-16 | End: 2024-03-14

## 2023-04-16 RX ORDER — AZITHROMYCIN 500 MG/1
250 TABLET, FILM COATED ORAL DAILY
Refills: 0 | Status: ACTIVE | OUTPATIENT
Start: 2023-04-17 | End: 2023-04-19

## 2023-04-16 RX ADMIN — Medication 25 MILLIGRAM(S): at 06:03

## 2023-04-16 RX ADMIN — RISPERIDONE 2 MILLIGRAM(S): 4 TABLET ORAL at 17:23

## 2023-04-16 RX ADMIN — Medication 48 MILLIGRAM(S): at 11:30

## 2023-04-16 RX ADMIN — Medication 10 MILLIGRAM(S): at 11:29

## 2023-04-16 RX ADMIN — Medication 81 MILLIGRAM(S): at 11:29

## 2023-04-16 RX ADMIN — HEPARIN SODIUM 5000 UNIT(S): 5000 INJECTION INTRAVENOUS; SUBCUTANEOUS at 21:33

## 2023-04-16 RX ADMIN — ATORVASTATIN CALCIUM 40 MILLIGRAM(S): 80 TABLET, FILM COATED ORAL at 21:32

## 2023-04-16 RX ADMIN — Medication 40 MILLIGRAM(S): at 06:41

## 2023-04-16 RX ADMIN — Medication 1 MILLIGRAM(S): at 11:29

## 2023-04-16 RX ADMIN — Medication 25 MILLIGRAM(S): at 17:24

## 2023-04-16 RX ADMIN — FINASTERIDE 5 MILLIGRAM(S): 5 TABLET, FILM COATED ORAL at 11:29

## 2023-04-16 RX ADMIN — Medication 50 MILLIGRAM(S): at 05:58

## 2023-04-16 RX ADMIN — POLYETHYLENE GLYCOL 3350 17 GRAM(S): 17 POWDER, FOR SOLUTION ORAL at 11:30

## 2023-04-16 RX ADMIN — Medication 5 MILLIGRAM(S): at 17:23

## 2023-04-16 RX ADMIN — RISPERIDONE 2 MILLIGRAM(S): 4 TABLET ORAL at 05:59

## 2023-04-16 RX ADMIN — TAMSULOSIN HYDROCHLORIDE 0.4 MILLIGRAM(S): 0.4 CAPSULE ORAL at 21:33

## 2023-04-16 RX ADMIN — HEPARIN SODIUM 5000 UNIT(S): 5000 INJECTION INTRAVENOUS; SUBCUTANEOUS at 05:56

## 2023-04-16 RX ADMIN — HEPARIN SODIUM 5000 UNIT(S): 5000 INJECTION INTRAVENOUS; SUBCUTANEOUS at 13:07

## 2023-04-16 RX ADMIN — Medication 1 TABLET(S): at 06:55

## 2023-04-16 RX ADMIN — Medication 5 MILLIGRAM(S): at 06:41

## 2023-04-16 NOTE — PHYSICAL THERAPY INITIAL EVALUATION ADULT - PERTINENT HX OF CURRENT PROBLEM, REHAB EVAL
62-year-old male with past medical history of COPD not on home O2, HLD, CAD status post PCI, anemia, bipolar disorder, GERD, gout presents with complaints of several days of generalized weakness. He stated that he started to feel short of breath on exertion about 2 days ago and reports subjective fever

## 2023-04-16 NOTE — PROGRESS NOTE ADULT - ASSESSMENT
62-year-old man with past medical history of COPD not on home O2, HLD, CAD status post PCI, anemia, bipolar disorder, GERD, gout presents with complaints of several days of generalized weakness. He stated that he started to feel short of breath on exertion about 2 days ago and reports subjective fever. This prompted his presentation tot he ED.   Patient denies chest pain, nausea, cough, vomiting, dizziness, loc, sick contact or recent travel. He ambulates with a walker and lives in assisted living. He quit smoking a month ago.     In the ED, /65, HR 98, RR 20, T 100.1, SpO2 97% on 3L NC . EKG showed NSR. CXR unremarkable. Labs were significant for Hgb 13.8, Trop 0.04. Patient received azithromycin x1, duoneb and solumedrol 125 x1    # Generalized Weakness and SOB 2/2 COPD exac w/ acute hypoxic resp failure (resolved)  SIRS on admission, but NO SEPSIS  afebrile, no initial leukocytosis   WBC up now from steroids  CXR unremarkable.  No signs fluid overload  Blood Cx: neg x2  can f/u TSH inpt or outpt  abx: azithro 250mg po q24 x3 more days  Pred 40mg po q24 for total 5 days and stop  off O2  albuterol inh q6 prn wheeze  symbicort 2 puffs q12    # nicotine  quit 30 days ago  smoked 5 cig /day    # Troponemia unlikely anything  Trop 0.04 -> <0.01  no active chest pain  EKG NSR   echo nl    # CAD status post PCI  stable  c/w asa 81    # HTN  c/w toprol xl 50 q24  c/w triam/hcta 37.5/25 q24    # HLD  LDL 84 - incr Lip to 40mg po q24  c/w fenofibrate 48 q24     # bipolar disorder  c/w Risperdal  c/w pregabalin 25mg po q12   c/w klonopin 1mg q12  c/w melatonin 3mg po qhs  c/w paxil 10mg q24    # BPH; overactive bladder  c/w proscar 5mg q24  c/w flomax 0.4 hs  c/w ditropan 5mg po q12    # chr OA  c/w percocet 5mg q8 prn  bowel reg: PEG q24    # GERD  not on meds    # suspect folate def  c/w folate  check lvl outpt    # DVT ppx: hep sc    Dispo: anticipate back to [facility] w/ plan above; COVID swab

## 2023-04-17 VITALS
TEMPERATURE: 97 F | DIASTOLIC BLOOD PRESSURE: 62 MMHG | HEART RATE: 60 BPM | SYSTOLIC BLOOD PRESSURE: 130 MMHG | RESPIRATION RATE: 18 BRPM

## 2023-04-17 LAB
A1C WITH ESTIMATED AVERAGE GLUCOSE RESULT: 5.6 % — SIGNIFICANT CHANGE UP (ref 4–5.6)
ALBUMIN SERPL ELPH-MCNC: 4.5 G/DL — SIGNIFICANT CHANGE UP (ref 3.5–5.2)
ALP SERPL-CCNC: 88 U/L — SIGNIFICANT CHANGE UP (ref 30–115)
ALT FLD-CCNC: 62 U/L — HIGH (ref 0–41)
ANION GAP SERPL CALC-SCNC: 12 MMOL/L — SIGNIFICANT CHANGE UP (ref 7–14)
AST SERPL-CCNC: 50 U/L — HIGH (ref 0–41)
BASOPHILS # BLD AUTO: 0.01 K/UL — SIGNIFICANT CHANGE UP (ref 0–0.2)
BASOPHILS NFR BLD AUTO: 0.1 % — SIGNIFICANT CHANGE UP (ref 0–1)
BILIRUB SERPL-MCNC: 0.5 MG/DL — SIGNIFICANT CHANGE UP (ref 0.2–1.2)
BUN SERPL-MCNC: 22 MG/DL — HIGH (ref 10–20)
CALCIUM SERPL-MCNC: 10 MG/DL — SIGNIFICANT CHANGE UP (ref 8.4–10.5)
CHLORIDE SERPL-SCNC: 100 MMOL/L — SIGNIFICANT CHANGE UP (ref 98–110)
CO2 SERPL-SCNC: 28 MMOL/L — SIGNIFICANT CHANGE UP (ref 17–32)
CREAT SERPL-MCNC: 1.1 MG/DL — SIGNIFICANT CHANGE UP (ref 0.7–1.5)
EGFR: 75 ML/MIN/1.73M2 — SIGNIFICANT CHANGE UP
EOSINOPHIL # BLD AUTO: 0.01 K/UL — SIGNIFICANT CHANGE UP (ref 0–0.7)
EOSINOPHIL NFR BLD AUTO: 0.1 % — SIGNIFICANT CHANGE UP (ref 0–8)
ESTIMATED AVERAGE GLUCOSE: 114 MG/DL — SIGNIFICANT CHANGE UP (ref 68–114)
GLUCOSE SERPL-MCNC: 111 MG/DL — HIGH (ref 70–99)
HCT VFR BLD CALC: 46.3 % — SIGNIFICANT CHANGE UP (ref 42–52)
HGB BLD-MCNC: 15.3 G/DL — SIGNIFICANT CHANGE UP (ref 14–18)
IMM GRANULOCYTES NFR BLD AUTO: 0.3 % — SIGNIFICANT CHANGE UP (ref 0.1–0.3)
LYMPHOCYTES # BLD AUTO: 1.8 K/UL — SIGNIFICANT CHANGE UP (ref 1.2–3.4)
LYMPHOCYTES # BLD AUTO: 24.4 % — SIGNIFICANT CHANGE UP (ref 20.5–51.1)
MAGNESIUM SERPL-MCNC: 2 MG/DL — SIGNIFICANT CHANGE UP (ref 1.8–2.4)
MCHC RBC-ENTMCNC: 29.6 PG — SIGNIFICANT CHANGE UP (ref 27–31)
MCHC RBC-ENTMCNC: 33 G/DL — SIGNIFICANT CHANGE UP (ref 32–37)
MCV RBC AUTO: 89.6 FL — SIGNIFICANT CHANGE UP (ref 80–94)
MONOCYTES # BLD AUTO: 0.63 K/UL — HIGH (ref 0.1–0.6)
MONOCYTES NFR BLD AUTO: 8.5 % — SIGNIFICANT CHANGE UP (ref 1.7–9.3)
NEUTROPHILS # BLD AUTO: 4.9 K/UL — SIGNIFICANT CHANGE UP (ref 1.4–6.5)
NEUTROPHILS NFR BLD AUTO: 66.6 % — SIGNIFICANT CHANGE UP (ref 42.2–75.2)
NRBC # BLD: 0 /100 WBCS — SIGNIFICANT CHANGE UP (ref 0–0)
PLATELET # BLD AUTO: 203 K/UL — SIGNIFICANT CHANGE UP (ref 130–400)
PMV BLD: 11.4 FL — HIGH (ref 7.4–10.4)
POTASSIUM SERPL-MCNC: 4.1 MMOL/L — SIGNIFICANT CHANGE UP (ref 3.5–5)
POTASSIUM SERPL-SCNC: 4.1 MMOL/L — SIGNIFICANT CHANGE UP (ref 3.5–5)
PROT SERPL-MCNC: 7.5 G/DL — SIGNIFICANT CHANGE UP (ref 6–8)
RBC # BLD: 5.17 M/UL — SIGNIFICANT CHANGE UP (ref 4.7–6.1)
RBC # FLD: 12.8 % — SIGNIFICANT CHANGE UP (ref 11.5–14.5)
SODIUM SERPL-SCNC: 140 MMOL/L — SIGNIFICANT CHANGE UP (ref 135–146)
TSH SERPL-MCNC: 0.69 UIU/ML — SIGNIFICANT CHANGE UP (ref 0.27–4.2)
WBC # BLD: 7.37 K/UL — SIGNIFICANT CHANGE UP (ref 4.8–10.8)
WBC # FLD AUTO: 7.37 K/UL — SIGNIFICANT CHANGE UP (ref 4.8–10.8)

## 2023-04-17 PROCEDURE — 99239 HOSP IP/OBS DSCHRG MGMT >30: CPT

## 2023-04-17 RX ORDER — BUDESONIDE AND FORMOTEROL FUMARATE DIHYDRATE 160; 4.5 UG/1; UG/1
2 AEROSOL RESPIRATORY (INHALATION)
Qty: 120 | Refills: 0
Start: 2023-04-17 | End: 2023-05-16

## 2023-04-17 RX ORDER — AZITHROMYCIN 500 MG/1
1 TABLET, FILM COATED ORAL
Qty: 0 | Refills: 0 | DISCHARGE
Start: 2023-04-17

## 2023-04-17 RX ORDER — BUDESONIDE AND FORMOTEROL FUMARATE DIHYDRATE 160; 4.5 UG/1; UG/1
2 AEROSOL RESPIRATORY (INHALATION)
Qty: 0 | Refills: 0 | DISCHARGE
Start: 2023-04-17

## 2023-04-17 RX ORDER — AZITHROMYCIN 500 MG/1
1 TABLET, FILM COATED ORAL
Qty: 2 | Refills: 0
Start: 2023-04-17 | End: 2023-04-18

## 2023-04-17 RX ADMIN — Medication 81 MILLIGRAM(S): at 11:56

## 2023-04-17 RX ADMIN — Medication 25 MILLIGRAM(S): at 18:53

## 2023-04-17 RX ADMIN — HEPARIN SODIUM 5000 UNIT(S): 5000 INJECTION INTRAVENOUS; SUBCUTANEOUS at 06:10

## 2023-04-17 RX ADMIN — Medication 10 MILLIGRAM(S): at 11:56

## 2023-04-17 RX ADMIN — Medication 40 MILLIGRAM(S): at 06:11

## 2023-04-17 RX ADMIN — Medication 25 MILLIGRAM(S): at 06:25

## 2023-04-17 RX ADMIN — Medication 48 MILLIGRAM(S): at 11:57

## 2023-04-17 RX ADMIN — Medication 5 MILLIGRAM(S): at 06:10

## 2023-04-17 RX ADMIN — Medication 1 TABLET(S): at 06:25

## 2023-04-17 RX ADMIN — RISPERIDONE 2 MILLIGRAM(S): 4 TABLET ORAL at 06:12

## 2023-04-17 RX ADMIN — FINASTERIDE 5 MILLIGRAM(S): 5 TABLET, FILM COATED ORAL at 11:56

## 2023-04-17 RX ADMIN — POLYETHYLENE GLYCOL 3350 17 GRAM(S): 17 POWDER, FOR SOLUTION ORAL at 11:55

## 2023-04-17 RX ADMIN — Medication 5 MILLIGRAM(S): at 18:53

## 2023-04-17 RX ADMIN — Medication 1 MILLIGRAM(S): at 11:56

## 2023-04-17 RX ADMIN — Medication 50 MILLIGRAM(S): at 06:24

## 2023-04-17 RX ADMIN — RISPERIDONE 2 MILLIGRAM(S): 4 TABLET ORAL at 18:54

## 2023-04-17 RX ADMIN — HEPARIN SODIUM 5000 UNIT(S): 5000 INJECTION INTRAVENOUS; SUBCUTANEOUS at 13:22

## 2023-04-17 RX ADMIN — AZITHROMYCIN 250 MILLIGRAM(S): 500 TABLET, FILM COATED ORAL at 11:56

## 2023-04-17 NOTE — DISCHARGE NOTE NURSING/CASE MANAGEMENT/SOCIAL WORK - NSDCPEFALRISK_GEN_ALL_CORE
For information on Fall & Injury Prevention, visit: [email or web address] OR  [email or web address] OR  https://www.cdc.gov/steadi/patient.html

## 2023-04-17 NOTE — PROGRESS NOTE ADULT - SUBJECTIVE AND OBJECTIVE BOX
MIYA BENITEZ  62y Male    INTERVAL HPI/OVERNIGHT EVENTS:    no complaints of pain, cough, SOB  no fever  feels well    T(F): 96.8 (04-17-23 @ 13:15), Max: 96.9 (04-16-23 @ 20:09)  HR: 62 (04-17-23 @ 13:15) (51 - 62)  BP: 133/66 (04-17-23 @ 13:15) (122/64 - 133/66)  RR: 18 (04-17-23 @ 13:15) (18 - 18)  SpO2: 100% (04-17-23 @ 05:31) (100% - 100%) on RA    PHYSICAL EXAM:  GENERAL: NAD  HEAD:  Normocephalic  EYES:  conjunctiva and sclera clear  ENMT: Moist mucous membranes  NERVOUS SYSTEM:  Alert, awake, Good concentration  CHEST/LUNG: CTA b/l; No rales, rhonchi, wheezing  HEART: Regular rate and rhythm  ABDOMEN: Soft, Nontender, Nondistended  EXTREMITIES:   No edema  SKIN: warm, dry    Consultant(s) Notes Reviewed:  [x ] YES  [ ] NO  Care Discussed with Consultants/Other Providers [ x] YES  [ ] NO    MEDICATIONS  (STANDING):  aspirin enteric coated 81 milliGRAM(s) Oral daily  atorvastatin 40 milliGRAM(s) Oral at bedtime  azithromycin   Tablet 250 milliGRAM(s) Oral daily  budesonide 160 MICROgram(s)/formoterol 4.5 MICROgram(s) Inhaler 2 Puff(s) Inhalation two times a day  coronavirus monovalent Vaccine (PFIZER) 0.3 milliLiter(s) IntraMuscular once  fenofibrate Tablet 48 milliGRAM(s) Oral daily  finasteride 5 milliGRAM(s) Oral daily  folic acid 1 milliGRAM(s) Oral daily  heparin   Injectable 5000 Unit(s) SubCutaneous every 8 hours  metoprolol succinate ER 50 milliGRAM(s) Oral daily  oxybutynin 5 milliGRAM(s) Oral two times a day  PARoxetine 10 milliGRAM(s) Oral daily  polyethylene glycol 3350 17 Gram(s) Oral daily  predniSONE   Tablet 40 milliGRAM(s) Oral daily  pregabalin 25 milliGRAM(s) Oral two times a day  risperiDONE   Tablet 2 milliGRAM(s) Oral two times a day  tamsulosin 0.4 milliGRAM(s) Oral at bedtime  triamterene 37.5 mG/hydrochlorothiazide 25 mG Tablet 1 Tablet(s) Oral daily    MEDICATIONS  (PRN):  acetaminophen     Tablet .. 650 milliGRAM(s) Oral every 6 hours PRN Temp greater or equal to 38C (100.4F), Mild Pain (1 - 3)  albuterol    90 MICROgram(s) HFA Inhaler 1 Puff(s) Inhalation every 6 hours PRN Wheezing  aluminum hydroxide/magnesium hydroxide/simethicone Suspension 30 milliLiter(s) Oral every 4 hours PRN Dyspepsia  clonazePAM  Tablet 1 milliGRAM(s) Oral two times a day PRN anxiety  melatonin 3 milliGRAM(s) Oral at bedtime PRN Insomnia  ondansetron Injectable 4 milliGRAM(s) IV Push every 8 hours PRN Nausea and/or Vomiting  oxycodone    5 mG/acetaminophen 325 mG 1 Tablet(s) Oral every 8 hours PRN Severe Pain (7 - 10)      LABS:                        15.3   7.37  )-----------( 203      ( 17 Apr 2023 08:41 )             46.3     04-17    140  |  100  |  22<H>  ----------------------------<  111<H>  4.1   |  28  |  1.1    Ca    10.0      17 Apr 2023 08:41  Mg     2.0     04-17    TPro  7.5  /  Alb  4.5  /  TBili  0.5  /  DBili  x   /  AST  50<H>  /  ALT  62<H>  /  AlkPhos  88  04-17        Culture - Urine (collected 15 Apr 2023 18:02)  Source: Clean Catch Clean Catch (Midstream)  Preliminary Report (16 Apr 2023 22:10):    10,000 - 49,000 CFU/mL Gram positive organisms    <10,000 CFU/ml Normal Urogenital elmo present    UA negative    Culture - Blood (collected 14 Apr 2023 23:17)  Source: .Blood Blood-Venous  Preliminary Report (16 Apr 2023 10:01):    No growth to date.    Culture - Blood (collected 14 Apr 2023 23:17)  Source: .Blood Blood-Venous  Preliminary Report (16 Apr 2023 10:01):    No growth to date.        RADIOLOGY & ADDITIONAL TESTS:    Imaging and report Personally Reviewed:  [x ] YES  [ ] NO    < from: Xray Chest 1 View-PORTABLE IMMEDIATE (Xray Chest 1 View-PORTABLE IMMEDIATE .) (04.14.23 @ 23:29) >  Impression:    No radiographic evidence of cardiopulmonary disease.    < end of copied text >      Case discussed with residents and RN on rounds today    Care discussed with pt        
  EMMANUELMIYA  62y  Male  ***My note supersedes ALL resident notes that I sign.  My corrections for their notes are in my note.***    I can be reached directly on [employer] [id number]. My office number is [phone]. My personal cell number is [phone].    INTERVAL EVENTS: Here for f/u of COPD exac. Pt has no cough or phlegm. Just had SOB for couple of days. He says he feels "great" today and ready to go back to [facility], which can be tomorrow. He is eating/drinking fine. He says he can walk. He had no complaints. He is off O2. Quit smoking 30 days ago. Only smoked up to 5 cig/day, he says.    T(F): 96.6 (23 @ 04:40), Max: 97.4 (04-15-23 @ 22:13)  HR: 69 (23 @ 04:40) (64 - 74)  BP: 132/78 (23 @ 04:40) (130/62 - 146/73)  RR: 18 (23 @ 04:40) (18 - 18)  SpO2: 98% (23 @ 04:40) (97% - 99%)    Gen: NAD  HEENT: PERRL, EOMI, mouth clr, nose clr  Neck: no nodes, no JVD, thyroid nl  lungs: decr BS b/l (mild); no crackles or wheeze  hrt: s1 s2 rrr no murmur  abd: soft, NT/ND, no HS megaly  ext: no edema, no c/c  neuro: aa ox3, cn intact, can move all 4 ext    LABS:                       13.9    (    89.2   13.45 )-----------( ---------      194      ( 2023 08:20 )             42.0    (    12.6     WBC Count: 13.45 K/uL (23 @ 08:20) - on steroids, clinically well  WBC Count: 7.77 K/uL (23 @ 23:17)    140   (   100   (   120      23 @ 08:20  ----------------------               4.3   (   30   (   19                             -----                        1.1  Ca  9.5   Mg  2.1    P   --     LFT  7.0  (  0.5  (  24       23 @ 08:20  -------------------------  4.2  (  80  (  19    CARDIAC MARKERS ( 15 Apr 2023 21:03 )  x     / <0.01 ng/mL / x     / x     / x      CARDIAC MARKERS ( 15 Apr 2023 00:37 )  x     / 0.04 ng/mL / x     / x     / x        Urinalysis Basic - ( 15 Apr 2023 18:02 )    Color: Light Yellow / Appearance: Clear / S.014 / pH: x  Gluc: x / Ketone: Negative  / Bili: Negative / Urobili: <2 mg/dL   Blood: x / Protein: Negative / Nitrite: Negative   Leuk Esterase: Negative / RBC: x / WBC x   Sq Epi: x / Non Sq Epi: x / Bacteria: x    Culture - Blood (collected 23 @ 23:17)  Source: .Blood Blood-Venous  Preliminary Report (23 @ 10:01):    No growth to date.    Culture - Blood (collected 23 @ 23:17)  Source: .Blood Blood-Venous  Preliminary Report (23 @ 10:01):    No growth to date.    RADIOLOGY & ADDITIONAL TESTS:  < from: Xray Chest 1 View-PORTABLE IMMEDIATE (Xray Chest 1 View-PORTABLE IMMEDIATE .) (23 @ 23:29) >  No radiographic evidence of cardiopulmonary disease.    < end of copied text >    < from: TTE Echo Complete w/ Contrast w/ Doppler (04.15.23 @ 10:55) >  Summary:   1. Technically limited study.   2. LV Ejection Fraction by Perera's Method with a biplane EF of 61 %.   3. Mild asymmetric left ventricular hypertrophy.   4. Spectral Doppler shows impaired relaxation pattern of left   ventricular myocardial filling (Grade I diastolic dysfunction).   5. Normal left atrial size.   6. Normal right atrial size.   7. Endocardial visualization was enhanced with intravenous echo contrast.    < end of copied text >    MEDICATIONS:    acetaminophen     Tablet .. 650 milliGRAM(s) Oral every 6 hours PRN  aluminum hydroxide/magnesium hydroxide/simethicone Suspension 30 milliLiter(s) Oral every 4 hours PRN  aspirin enteric coated 81 milliGRAM(s) Oral daily  atorvastatin 20 milliGRAM(s) Oral at bedtime  clonazePAM  Tablet 1 milliGRAM(s) Oral two times a day PRN  coronavirus monovalent Vaccine (PFIZER) 0.3 milliLiter(s) IntraMuscular once  fenofibrate Tablet 48 milliGRAM(s) Oral daily  finasteride 5 milliGRAM(s) Oral daily  folic acid 1 milliGRAM(s) Oral daily  heparin   Injectable 5000 Unit(s) SubCutaneous every 8 hours  melatonin 3 milliGRAM(s) Oral at bedtime PRN  metoprolol succinate ER 50 milliGRAM(s) Oral daily  ondansetron Injectable 4 milliGRAM(s) IV Push every 8 hours PRN  oxybutynin 5 milliGRAM(s) Oral two times a day  oxycodone    5 mG/acetaminophen 325 mG 1 Tablet(s) Oral every 8 hours PRN  PARoxetine 10 milliGRAM(s) Oral daily  polyethylene glycol 3350 17 Gram(s) Oral daily  predniSONE   Tablet 40 milliGRAM(s) Oral daily  pregabalin 25 milliGRAM(s) Oral two times a day  risperiDONE   Tablet 2 milliGRAM(s) Oral two times a day  tamsulosin 0.4 milliGRAM(s) Oral at bedtime  triamterene 37.5 mG/hydrochlorothiazide 25 mG Tablet 1 Tablet(s) Oral daily

## 2023-04-17 NOTE — DISCHARGE NOTE PROVIDER - CARE PROVIDER_API CALL
Rubin Leahy (DO)  Internal Medicine; Pulmonary Disease  [address] [place] [address]  Phone: [phone]  Fax: [phone]  Follow Up Time: 1 week

## 2023-04-17 NOTE — DISCHARGE NOTE NURSING/CASE MANAGEMENT/SOCIAL WORK - PATIENT PORTAL LINK FT
You can access the FollowMyHealth Patient Portal offered by [facility] by registering at the following website: [email or web address]. By joining [facility]’s FollowMyHealth portal, you will also be able to view your health information using other applications (apps) compatible with our system.

## 2023-04-17 NOTE — DISCHARGE NOTE PROVIDER - NSDCCPCAREPLAN_GEN_ALL_CORE_FT
PRINCIPAL DISCHARGE DIAGNOSIS  Diagnosis: COPD exacerbation  Assessment and Plan of Treatment: Chronic obstructive pulmonary disease (COPD) is a lung condition in which airflow from the lungs is limited. Causes include smoking, secondhand smoke exposure, genetics, or recurrent infections. Take all medicines (inhaled or pills) as directed by your health care provider. Avoid exposure to irritants such as smoke, chemicals, and fumes that aggravate your breathing.  If you are a smoker, the most important thing that you can do is stop smoking. Continuing to smoke will cause further lung damage and breathing trouble. Ask your health care provider for help with quitting smoking.  SEEK IMMEDIATE MEDICAL CARE IF YOU HAVE ANY OF THE FOLLOWING SYMPTOMS: shortness of breath at rest or when talking, bluish discoloration of lips, skin, fever, worsening cough, unexplained chest pain, or lightheadedness/dizziness.

## 2023-04-17 NOTE — DISCHARGE NOTE PROVIDER - NSDCMRMEDTOKEN_GEN_ALL_CORE_FT
alendronate 70 mg oral tablet: 1 tab(s) orally once a week  Aspir 81 oral delayed release tablet: 1 tab(s) orally once a day  atorvastatin 20 mg oral tablet: 1 tab(s) orally once a day (at bedtime)  azithromycin 250 mg oral tablet: 1 tab(s) orally once a day through 4/19  budesonide-formoterol 160 mcg-4.5 mcg/inh inhalation aerosol: 2 puff(s) inhaled 2 times a day  clonazePAM 1 mg oral tablet: 1 tab(s) orally 2 times a day  Dyazide 25 mg-37.5 mg oral capsule: 1 cap(s) orally once a day  Flomax 0.4 mg oral capsule: 1 cap(s) orally once a day  folic acid 1 mg oral tablet: 1 tab(s) orally once a day  Lyrica 25 mg oral capsule: 1 cap(s) orally 2 times a day  metFORMIN 500 mg oral tablet: 0.5 tab(s) orally once a day  metoprolol succinate 50 mg oral capsule, extended release: 1 cap(s) orally once a day  Omega-3 oral capsule: 1 cap(s) orally once a day  oxybutynin 5 mg oral tablet: 1 tab(s) orally 2 times a day  oxycodone-acetaminophen 5 mg-325 mg oral tablet: 1 tab(s) orally 3 times a day  PARoxetine 10 mg oral tablet: 1 tab(s) orally once a day  polyethylene glycol 3350 oral powder for reconstitution: 17 gram(s) orally once a day  predniSONE 20 mg oral tablet: 2 tab(s) orally once a day through 4/19  Proscar 5 mg oral tablet: 1 tab(s) orally once a day  RisperDAL 2 mg oral tablet: 1 tab(s) orally 2 times a day  senna (sennosides) 8.6 mg oral tablet: 2 tab(s) orally 2 times a day  TriCor 48 mg oral tablet: 1 tab(s) orally once a day

## 2023-04-17 NOTE — PROGRESS NOTE ADULT - ASSESSMENT
61 y/o man with past medical history of COPD not on home O2, HLD, CAD status post PCI, anemia, bipolar disorder, GERD, gout presents with complaints of several days of generalized weakness. He stated that he started to feel short of breath on exertion about 2 days ago and reports subjective fever. This prompted his presentation tot he ED. Patient denies chest pain, nausea, cough, vomiting, dizziness, loc, sick contact or recent travel. He ambulates with a walker and lives in assisted living. He quit smoking a month ago.     1. Generalized Weakness and SOB due to COPD exacerbation w/ acute hypoxemic resp failure (resolved)  SIRS on admission, but NO SEPSIS  afebrile, no initial leukocytosis   WBC increased from steroids  CXR unremarkable.  No signs fluid overload  Blood Cx: neg x2  abx: azithro 250mg po q24 x2 more days  Pred 40mg po q24 for total 5 days and stop  off O2  albuterol inh q6 prn wheeze  symbicort 2 puffs q12  encourage continued smoking cessation    2. Elevated troponin level - resolved  Trop 0.04 -> <0.01  no active chest pain  EKG NSR   echo nl    3. CAD status post PCI  stable  c/w asa 81, statin and metoprolol    4. HTN  c/w toprol xl 50 q24  c/w triam/hcta 37.5/25 q24    5. Hyperlipidemia  LDL 84 - increased Lip to 40mg po q24  c/w fenofibrate 48 q24     6. Bipolar disorder  c/w Risperdal 2mg bid  c/w pregabalin 25mg po q12   c/w klonopin 1mg q12  c/w melatonin 3mg po qhs  c/w paxil 10mg q24    7. BPH; overactive bladder  c/w proscar 5mg q24  c/w flomax 0.4 hs  c/w ditropan 5mg po q12    8. suspect folate deficiency  c/w folate  check lvl outpt    med reconciliation and discharge papers reviewed today  spent 40 minutes on the discharge process of this pt

## 2023-04-17 NOTE — DISCHARGE NOTE PROVIDER - HOSPITAL COURSE
62-year-old man with past medical history of COPD not on home O2, HLD, CAD status post PCI, anemia, bipolar disorder, GERD, gout presents with complaints of several days of generalized weakness. He stated that he started to feel short of breath on exertion about 2 days ago and reports subjective fever. This prompted his presentation tot he ED.   Patient denies chest pain, nausea, cough, vomiting, dizziness, loc, sick contact or recent travel. He ambulates with a walker and lives in assisted living. He quit smoking a month ago.     In the ED, /65, HR 98, RR 20, T 100.1, SpO2 97% on 3L NC . EKG showed NSR. CXR unremarkable. Labs were significant for Hgb 13.8, Trop 0.04. Patient received azithromycin x1, duoneb and solumedrol 125 x1    # Generalized Weakness and SOB 2/2 COPD exac w/ acute hypoxic resp failure (resolved)  SIRS on admission, but NO SEPSIS  afebrile, no initial leukocytosis   WBC up now from steroids  CXR unremarkable.  No signs fluid overload  Blood Cx: neg x2  can f/u TSH inpt or outpt  abx: azithro 250mg po q24 x3 more days  Pred 40mg po q24 for total 5 days and stop  off O2  albuterol inh q6 prn wheeze  symbicort 2 puffs q12    # nicotine  quit 30 days ago  smoked 5 cig /day    # Troponemia unlikely anything  Trop 0.04 -> <0.01  no active chest pain  EKG NSR   echo nl    # CAD status post PCI  stable  c/w asa 81    # HTN  c/w toprol xl 50 q24  c/w triam/hcta 37.5/25 q24    # HLD  LDL 84 - incr Lip to 40mg po q24  c/w fenofibrate 48 q24     # bipolar disorder  c/w Risperdal  c/w pregabalin 25mg po q12   c/w klonopin 1mg q12  c/w melatonin 3mg po qhs  c/w paxil 10mg q24    # BPH; overactive bladder  c/w proscar 5mg q24  c/w flomax 0.4 hs  c/w ditropan 5mg po q12    # chr OA  c/w percocet 5mg q8 prn  bowel reg: PEG q24    # GERD  not on meds    # suspect folate def  c/w folate  check lvl outpt

## 2023-04-18 LAB
CULTURE RESULTS: SIGNIFICANT CHANGE UP
SPECIMEN SOURCE: SIGNIFICANT CHANGE UP

## 2023-04-20 DIAGNOSIS — E78.5 HYPERLIPIDEMIA, UNSPECIFIED: ICD-10-CM

## 2023-04-20 DIAGNOSIS — M19.90 UNSPECIFIED OSTEOARTHRITIS, UNSPECIFIED SITE: ICD-10-CM

## 2023-04-20 DIAGNOSIS — F31.9 BIPOLAR DISORDER, UNSPECIFIED: ICD-10-CM

## 2023-04-20 DIAGNOSIS — Z20.822 CONTACT WITH AND (SUSPECTED) EXPOSURE TO COVID-19: ICD-10-CM

## 2023-04-20 DIAGNOSIS — J96.01 ACUTE RESPIRATORY FAILURE WITH HYPOXIA: ICD-10-CM

## 2023-04-20 DIAGNOSIS — R65.11 SYSTEMIC INFLAMMATORY RESPONSE SYNDROME (SIRS) OF NON-INFECTIOUS ORIGIN WITH ACUTE ORGAN DYSFUNCTION: ICD-10-CM

## 2023-04-20 DIAGNOSIS — M10.9 GOUT, UNSPECIFIED: ICD-10-CM

## 2023-04-20 DIAGNOSIS — Z87.891 PERSONAL HISTORY OF NICOTINE DEPENDENCE: ICD-10-CM

## 2023-04-20 DIAGNOSIS — K21.9 GASTRO-ESOPHAGEAL REFLUX DISEASE WITHOUT ESOPHAGITIS: ICD-10-CM

## 2023-04-20 DIAGNOSIS — N40.0 BENIGN PROSTATIC HYPERPLASIA WITHOUT LOWER URINARY TRACT SYMPTOMS: ICD-10-CM

## 2023-04-20 DIAGNOSIS — Z95.5 PRESENCE OF CORONARY ANGIOPLASTY IMPLANT AND GRAFT: ICD-10-CM

## 2023-04-20 DIAGNOSIS — J44.1 CHRONIC OBSTRUCTIVE PULMONARY DISEASE WITH (ACUTE) EXACERBATION: ICD-10-CM

## 2023-04-20 DIAGNOSIS — Z79.82 LONG TERM (CURRENT) USE OF ASPIRIN: ICD-10-CM

## 2023-04-20 DIAGNOSIS — N32.81 OVERACTIVE BLADDER: ICD-10-CM

## 2023-04-20 DIAGNOSIS — R77.8 OTHER SPECIFIED ABNORMALITIES OF PLASMA PROTEINS: ICD-10-CM

## 2023-04-20 DIAGNOSIS — Z79.84 LONG TERM (CURRENT) USE OF ORAL HYPOGLYCEMIC DRUGS: ICD-10-CM

## 2023-04-20 DIAGNOSIS — I25.10 ATHEROSCLEROTIC HEART DISEASE OF NATIVE CORONARY ARTERY WITHOUT ANGINA PECTORIS: ICD-10-CM

## 2023-04-20 DIAGNOSIS — E53.8 DEFICIENCY OF OTHER SPECIFIED B GROUP VITAMINS: ICD-10-CM

## 2023-04-20 LAB
CULTURE RESULTS: SIGNIFICANT CHANGE UP
CULTURE RESULTS: SIGNIFICANT CHANGE UP
SPECIMEN SOURCE: SIGNIFICANT CHANGE UP
SPECIMEN SOURCE: SIGNIFICANT CHANGE UP

## 2024-10-25 ENCOUNTER — INPATIENT (INPATIENT)
Facility: HOSPITAL | Age: 64
LOS: 1 days | Discharge: NURSING, REHAB OR LONG-TERM CARE | DRG: 556 | End: 2024-10-27
Attending: HOSPITALIST | Admitting: STUDENT IN AN ORGANIZED HEALTH CARE EDUCATION/TRAINING PROGRAM
Payer: MEDICARE

## 2024-10-25 VITALS
WEIGHT: 248.02 LBS | RESPIRATION RATE: 16 BRPM | OXYGEN SATURATION: 96 % | HEART RATE: 61 BPM | TEMPERATURE: 98 F | SYSTOLIC BLOOD PRESSURE: 108 MMHG | DIASTOLIC BLOOD PRESSURE: 59 MMHG

## 2024-10-25 DIAGNOSIS — R26.2 DIFFICULTY IN WALKING, NOT ELSEWHERE CLASSIFIED: ICD-10-CM

## 2024-10-25 LAB
ALBUMIN SERPL ELPH-MCNC: 4 G/DL — SIGNIFICANT CHANGE UP (ref 3.5–5.2)
ALP SERPL-CCNC: 48 U/L — SIGNIFICANT CHANGE UP (ref 30–115)
ALT FLD-CCNC: 13 U/L — SIGNIFICANT CHANGE UP (ref 0–41)
ANION GAP SERPL CALC-SCNC: 11 MMOL/L — SIGNIFICANT CHANGE UP (ref 7–14)
APTT BLD: 35.3 SEC — SIGNIFICANT CHANGE UP (ref 27–39.2)
AST SERPL-CCNC: 16 U/L — SIGNIFICANT CHANGE UP (ref 0–41)
BASOPHILS # BLD AUTO: 0.04 K/UL — SIGNIFICANT CHANGE UP (ref 0–0.2)
BASOPHILS NFR BLD AUTO: 0.4 % — SIGNIFICANT CHANGE UP (ref 0–1)
BILIRUB SERPL-MCNC: 0.6 MG/DL — SIGNIFICANT CHANGE UP (ref 0.2–1.2)
BUN SERPL-MCNC: 17 MG/DL — SIGNIFICANT CHANGE UP (ref 10–20)
CALCIUM SERPL-MCNC: 9.3 MG/DL — SIGNIFICANT CHANGE UP (ref 8.4–10.5)
CHLORIDE SERPL-SCNC: 101 MMOL/L — SIGNIFICANT CHANGE UP (ref 98–110)
CO2 SERPL-SCNC: 31 MMOL/L — SIGNIFICANT CHANGE UP (ref 17–32)
CREAT SERPL-MCNC: 1.5 MG/DL — SIGNIFICANT CHANGE UP (ref 0.7–1.5)
EGFR: 51 ML/MIN/1.73M2 — LOW
EOSINOPHIL # BLD AUTO: 0.04 K/UL — SIGNIFICANT CHANGE UP (ref 0–0.7)
EOSINOPHIL NFR BLD AUTO: 0.4 % — SIGNIFICANT CHANGE UP (ref 0–8)
ETHANOL SERPL-MCNC: <10 MG/DL — SIGNIFICANT CHANGE UP
GLUCOSE SERPL-MCNC: 89 MG/DL — SIGNIFICANT CHANGE UP (ref 70–99)
HCT VFR BLD CALC: 42.7 % — SIGNIFICANT CHANGE UP (ref 42–52)
HGB BLD-MCNC: 13.4 G/DL — LOW (ref 14–18)
IMM GRANULOCYTES NFR BLD AUTO: 0.3 % — SIGNIFICANT CHANGE UP (ref 0.1–0.3)
INR BLD: 1.79 RATIO — HIGH (ref 0.65–1.3)
LACTATE SERPL-SCNC: 0.9 MMOL/L — SIGNIFICANT CHANGE UP (ref 0.7–2)
LIDOCAIN IGE QN: 10 U/L — SIGNIFICANT CHANGE UP (ref 7–60)
LYMPHOCYTES # BLD AUTO: 2.17 K/UL — SIGNIFICANT CHANGE UP (ref 1.2–3.4)
LYMPHOCYTES # BLD AUTO: 21.4 % — SIGNIFICANT CHANGE UP (ref 20.5–51.1)
MCHC RBC-ENTMCNC: 29.5 PG — SIGNIFICANT CHANGE UP (ref 27–31)
MCHC RBC-ENTMCNC: 31.4 G/DL — LOW (ref 32–37)
MCV RBC AUTO: 93.8 FL — SIGNIFICANT CHANGE UP (ref 80–94)
MONOCYTES # BLD AUTO: 0.95 K/UL — HIGH (ref 0.1–0.6)
MONOCYTES NFR BLD AUTO: 9.4 % — HIGH (ref 1.7–9.3)
NEUTROPHILS # BLD AUTO: 6.92 K/UL — HIGH (ref 1.4–6.5)
NEUTROPHILS NFR BLD AUTO: 68.1 % — SIGNIFICANT CHANGE UP (ref 42.2–75.2)
NRBC # BLD: 0 /100 WBCS — SIGNIFICANT CHANGE UP (ref 0–0)
PLATELET # BLD AUTO: 185 K/UL — SIGNIFICANT CHANGE UP (ref 130–400)
PMV BLD: 11.3 FL — HIGH (ref 7.4–10.4)
POTASSIUM SERPL-MCNC: 3.8 MMOL/L — SIGNIFICANT CHANGE UP (ref 3.5–5)
POTASSIUM SERPL-SCNC: 3.8 MMOL/L — SIGNIFICANT CHANGE UP (ref 3.5–5)
PROT SERPL-MCNC: 6.9 G/DL — SIGNIFICANT CHANGE UP (ref 6–8)
PROTHROM AB SERPL-ACNC: 20.5 SEC — HIGH (ref 9.95–12.87)
RBC # BLD: 4.55 M/UL — LOW (ref 4.7–6.1)
RBC # FLD: 14 % — SIGNIFICANT CHANGE UP (ref 11.5–14.5)
SODIUM SERPL-SCNC: 143 MMOL/L — SIGNIFICANT CHANGE UP (ref 135–146)
WBC # BLD: 10.15 K/UL — SIGNIFICANT CHANGE UP (ref 4.8–10.8)
WBC # FLD AUTO: 10.15 K/UL — SIGNIFICANT CHANGE UP (ref 4.8–10.8)

## 2024-10-25 PROCEDURE — 70450 CT HEAD/BRAIN W/O DYE: CPT | Mod: 26,MC

## 2024-10-25 PROCEDURE — 97163 PT EVAL HIGH COMPLEX 45 MIN: CPT | Mod: GP

## 2024-10-25 PROCEDURE — 85730 THROMBOPLASTIN TIME PARTIAL: CPT

## 2024-10-25 PROCEDURE — 36415 COLL VENOUS BLD VENIPUNCTURE: CPT

## 2024-10-25 PROCEDURE — 85610 PROTHROMBIN TIME: CPT

## 2024-10-25 PROCEDURE — 72125 CT NECK SPINE W/O DYE: CPT | Mod: 26,MC

## 2024-10-25 PROCEDURE — 99285 EMERGENCY DEPT VISIT HI MDM: CPT | Mod: GC

## 2024-10-25 PROCEDURE — 93970 EXTREMITY STUDY: CPT

## 2024-10-25 PROCEDURE — 71045 X-RAY EXAM CHEST 1 VIEW: CPT | Mod: 26

## 2024-10-25 PROCEDURE — 85025 COMPLETE CBC W/AUTO DIFF WBC: CPT

## 2024-10-25 PROCEDURE — 80053 COMPREHEN METABOLIC PANEL: CPT

## 2024-10-25 PROCEDURE — 99222 1ST HOSP IP/OBS MODERATE 55: CPT

## 2024-10-25 PROCEDURE — 74177 CT ABD & PELVIS W/CONTRAST: CPT | Mod: 26,MC

## 2024-10-25 PROCEDURE — 72170 X-RAY EXAM OF PELVIS: CPT | Mod: 26

## 2024-10-25 PROCEDURE — 71260 CT THORAX DX C+: CPT | Mod: 26,MC

## 2024-10-25 PROCEDURE — 83036 HEMOGLOBIN GLYCOSYLATED A1C: CPT

## 2024-10-25 NOTE — ED PROVIDER NOTE - ATTENDING CONTRIBUTION TO CARE
64-year-old male to ED with back pain and leg pain after a fall.  Patient had a fall after nursing home 2 weeks ago and is on Eliquis.  Complaining of persistent back pain and difficulty walking so to ED for evaluation.  No LOC but some questionable head trauma otherwise.  Exam as noted.  No external signs of trauma no bruising or lacerations or abrasions noted.  Moving all extremities

## 2024-10-25 NOTE — PATIENT PROFILE ADULT - FALL HARM RISK - HARM RISK INTERVENTIONS

## 2024-10-25 NOTE — H&P ADULT - ATTENDING COMMENTS
64-year-old male, past medical history of  COPD not on home O2, HLD, CAD status post PCI, anemia, bipolar disorder, GERD, gout, RLE DVT on Eliquis,  from [facility] living [facility] presenting for evaluation of difficulty ambulating.     #Mechanical fall  #RLE edema with chronic venous stases   #hx of RLE dvt on eliquis   #COPD no exacerbation  #CAD s/p PCI    Plan  patient states right leg swelling is chronic. follows outpatient with vascular. Last va duplex was one month ago outpatient and states no DVT at that time. Denies worsening of RLE edema   CT imaging reviewed. opacity of the right upper lobe is present  No signs of active infection, no cough, no shortness of breath, chest pain, no wheezing   Labs are unremarkable  continue Eliquis   PT consult   monitor off antibiotics for now     Dispo: pending PT consult 64-year-old male, past medical history of  COPD not on home O2, HLD, CAD status post PCI, anemia, bipolar disorder, GERD, gout, RLE DVT on Eliquis,  from [facility] living [facility] presenting for evaluation of difficulty ambulating.     #Mechanical fall  #RLE edema with chronic venous stases   #hx of RLE dvt on eliquis   #COPD no exacerbation  #CAD s/p PCI    Plan  patient states right leg swelling is chronic. follows outpatient with vascular. Last va duplex was one month ago outpatient and states no DVT at that time. Denies worsening of RLE edema   CT imaging reviewed. opacity of the right upper lobe is present  No signs of active infection, no cough, no shortness of breath, chest pain, no wheezing   Labs are unremarkable  Va duplex   continue Eliquis   PT consult   monitor off antibiotics for now     Dispo: pending PT consult

## 2024-10-25 NOTE — ED ADULT TRIAGE NOTE - CHIEF COMPLAINT QUOTE
KINJAL from [facility]. "Back pain, cant walk, legs are bad"  Fall 1 week ago, cant ambulate on his own since then.

## 2024-10-25 NOTE — ED PROVIDER NOTE - PHYSICAL EXAMINATION
VITAL SIGNS: I have reviewed nursing notes and confirm.  CONSTITUTIONAL: well-appearing, non-toxic, NAD  SKIN: Warm dry, normal skin turgor  HEAD: NCAT  EYES: EOMI, PERRLA, no scleral icterus  ENT: Moist mucous membranes, normal pharynx with no erythema or exudates  NECK: Supple; non tender. Full ROM. No cervical LAD  CARD: RRR, no murmurs, rubs or gallops  RESP: clear to ausculation b/l.  No rales, rhonchi, or wheezing.  ABD: soft, + BS, non-tender, non-distended, no rebound or guarding. No CVA tenderness  EXT: Full ROM, no bony tenderness, (+)  pedal edema R>L, no calf tenderness  NEURO: normal motor. normal sensory. CN II-XII intact. Cerebellar testing normal. Normal gait.  PSYCH: Cooperative, appropriate.

## 2024-10-25 NOTE — ED PROVIDER NOTE - WR ORDER NAME 1
Location: right lower lateral, medial and posterior wounds   Apply Primary Dressing to wound: Alginate with silver pad  Secondary Dressing: 4X4 gauze pad   Avoid contact of tape with skin if possible. When to change Dressing: DO NOT CHANGE    [x] Multilayer Compression Wrap:  Type: 4 Layer Compression Wrap- right    Do not get leg(s) with wrap wet. If wraps become too tight call the center or completely remove the wrap. Elevate leg(s) above the level of the heart when sitting. Avoid prolonged standing in one place. Applied in Clinic on 8/22/2022  The Goal of this therapy is to reduce edema and get into long term compression garments to control venous insufficiency, lymphedema and reduce occurrence of venous ulcers    [x] Edema Control:  Apply: Compression Stocking on Left     Elevate leg(s) above the level of the heart for 30 minutes 4-5 times a day and/or when sitting. Avoid prolonged standing in one place. [x] Lymphedema Therapy:  Wear Lymphedema pumps twice a day at settings prescribed by your physician. Avoid prolonged standing in one place. Dietary:  Important dietary reminders:  1. Increase Protein intake (i.e. Lean meats, fish, eggs, legumes, and yogurt)  2. No added salt  3. If diabetic, follow a diabetic diet and check glucose prior to meals or as instructed by your physician. Dietary Supplements(Take twice a day unless instructed otherwise):  [] Mark Borrow  [] 30ml ProStat [] Lani Picking [] Ensure Max/Premier [] Other:    Your nurse  is:  Tito Still     Electronically signed by Diann Navarro RN on 8/22/2022 at 10:47 AM     Christine Albarran 281: Should you experience any significant changes in your wound(s) or have questions about your wound care, please contact the [address] [place] [address] at [phone]. We are open from 8:00am - 4:30p Monday, Thursday and Friday; 11:00am - 5pm on Tuesday and CLOSED Wednesday.  Please give us 24-48 business hours to return your
Xray Pelvis AP only

## 2024-10-25 NOTE — ED ADULT NURSE NOTE - CHIEF COMPLAINT QUOTE
KINJAL from [facility]. "Back pain, cant walk, legs are bad"  Fall 1 week ago, cant ambulate on his own since then.

## 2024-10-25 NOTE — ED PROVIDER NOTE - OBJECTIVE STATEMENT
Patient is a 64-year-old male, past medical history of  COPD not on home O2, HLD, CAD status post PCI, anemia, bipolar disorder, GERD, gout from [facility] living [facility] presenting for evaluation of difficulty ambulating.  Patient states he has pain in bilateral legs.  Patient relates that he fell 1 week ago, he admits this was mechanical trip and fall while trying to get into his bed while using his walker.  Patient fell forward, no head trauma, LOC, nausea, vomiting.  Since fall he has been having increased difficulty ambulating.  Patient initially ambulates with a walker at baseline. Patient is a 64-year-old male, past medical history of  COPD not on home O2, HLD, CAD status post PCI, anemia, bipolar disorder, GERD, gout, RLE DVT on Eliquis,  from [facility] presenting for evaluation of difficulty ambulating.  Patient states he has pain in bilateral legs.  Patient relates that he fell 1 week ago, he admits this was mechanical trip and fall while trying to get into his bed while using his walker.  Patient fell forward, no head trauma, LOC, nausea, vomiting.  Since fall he has been having increased difficulty ambulating.  Patient initially ambulates with a walker at baseline.

## 2024-10-25 NOTE — PATIENT PROFILE ADULT - MEDICATIONS/VISITS
4:04 PM 
CM reviewed EMR and met with pt in room with mask on. Transition of Care:  
RUR49% 1. Return to snf- sent referral to [address] [place] [address]- they have accepted 2. Cardio consulted 3. Pulmonary consulted 4. Gastro consulted 5. Palliative consult 6. CM to follow for discharge needs Dominic Fallon no

## 2024-10-25 NOTE — H&P ADULT - NSHPPHYSICALEXAM_GEN_ALL_CORE
GENERAL: NAD, lying in bed comfortably  CHEST/LUNG: Clear to auscultation bilaterally; No rales, rhonchi, wheezing, or rubs. Unlabored respirations  HEART: Regular rate and rhythm; No murmurs, rubs, or gallops  ABDOMEN: Bowel sounds present; Soft, Nontender, Nondistended.    EXTREMITIES:  right sided LE edema and chronic venous stasus   NERVOUS SYSTEM:  Alert & Oriented X3, speech clear. No deficits GENERAL: NAD, lying in bed comfortably  CHEST/LUNG: Clear to auscultation bilaterally; No rales, rhonchi, wheezing, or rubs. Unlabored respirations  HEART: Regular rate and rhythm; No murmurs, rubs, or gallops  ABDOMEN: Bowel sounds present; Soft, Nontender, Nondistended.    EXTREMITIES:  right sided LE edema and chronic venous stasis   NERVOUS SYSTEM:  Alert & Oriented X3, speech clear. No deficits GENERAL: NAD, lying in bed comfortably  CHEST/LUNG: Clear to auscultation bilaterally; No rales, rhonchi, wheezing, or rubs. Unlabored respirations  HEART: Regular rate and rhythm; No murmurs, rubs, or gallops  ABDOMEN: Bowel sounds present; Soft, Nontender, Nondistended.    EXTREMITIES:  right sided LE edema and chronic venous stasis with increased warmth vs. left  NERVOUS SYSTEM:  Alert & Oriented X3, speech clear. No deficits

## 2024-10-25 NOTE — H&P ADULT - ASSESSMENT
Patient is a 64-year-old male, PMH of COPD not on home O2, HLD, CAD status post PCI, anemia, bipolar disorder, GERD, gout, RLE DVT on Eliquis, from [facility] presenting for evaluation of difficulty ambulating.      #Difficulty Ambulating, likely mechanical  #Venous Stasis  #Hx RLE DVT on eliquis  #Hx of outpatient falls  - Vitals stable on admission  - Patient states he has chronic leg swelling, right worse than left.   - Last va duplex was one month ago outpatient and states no DVT at that time.   - Patient relates that he fell 1 week ago, he admits this was mechanical trip and fall while trying to get into his bed while using his walker.   - Patient fell forward, no head trauma, LOC, nausea, vomiting.   - Denied any lightheadness or SOB prior to fall. Since fall he has been having increased difficulty ambulating.    - Patient initially ambulates with a walker at baseline.  - CT Cerv Spine + Head: negative for acute findings  - CT Chest Ab Pelvis w/ IV: No CT evidence of acute traumatic injury within the chest, abdomen or pelvis. No hip fractures.  - c/w eliquis    #Hx of COPD  - currently on RA  - monitor    #HLD  #CAD s/p PCI  - c/w home statin    #Hx of Bipolar  - c/w home meds    #Hx of GERD  - PPI    #Hx of Gout    Misc   DVT: eliquis  GI: PPI  Diet: DASH  Activity: IAT  Dispo: PT [facility]       Patient is a 64-year-old male, PMH of COPD not on home O2, HLD, CAD status post PCI, anemia, bipolar disorder, GERD, gout, RLE DVT on Eliquis, from [facility] presenting for evaluation of difficulty ambulating.      #Difficulty Ambulating, likely mechanical  #Venous Stasis  #Hx RLE DVT on eliquis  #Hx of outpatient falls  - Vitals stable on admission  - Patient states he has chronic leg swelling, right worse than left.   - Last va duplex was one month ago outpatient and states no DVT at that time.   - Patient relates that he fell 1 week ago, he admits this was mechanical trip and fall while trying to get into his bed while using his walker.   - Patient fell forward, no head trauma, LOC, nausea, vomiting.   - Denied any lightheadness or SOB prior to fall. Since fall he has been having increased difficulty ambulating.    - Patient initially ambulates with a walker at baseline.  - CT Cerv Spine + Head: negative for acute findings  - CT Chest Ab Pelvis w/ IV: No CT evidence of acute traumatic injury within the chest, abdomen or pelvis. No hip fractures.  - c/w eliquis  - LE Duplex    #Hx of COPD  - currently on RA  - monitor    #HLD  #CAD s/p PCI  - c/w home statin    #Hx of Bipolar  - c/w home meds    #Hx of GERD  - PPI    #Hx of Gout    Misc   DVT: eliquis  GI: PPI  Diet: DASH  Activity: IAT  Dispo: PT [facility]       Patient is a 64-year-old male, PMH of COPD not on home O2, HLD, CAD status post PCI, anemia, bipolar disorder, GERD, gout, RLE DVT on Eliquis, from [facility] presenting for evaluation of difficulty ambulating.      #Difficulty Ambulating, likely mechanical  #Venous Stasis  #Hx RLE DVT on eliquis  #Hx of outpatient falls  - Vitals stable on admission  - Patient states he has chronic leg swelling, right worse than left.   - Last va duplex was one month ago outpatient and states no DVT at that time.   - Patient relates that he fell 1 week ago, he admits this was mechanical trip and fall while trying to get into his bed while using his walker.   - Patient fell forward, no head trauma, LOC, nausea, vomiting.   - Denied any lightheadness or SOB prior to fall. Since fall he has been having increased difficulty ambulating.    - Patient initially ambulates with a walker at baseline.  - CT Cerv Spine + Head: negative for acute findings  - CT Chest Ab Pelvis w/ IV: No CT evidence of acute traumatic injury within the chest, abdomen or pelvis. No hip fractures.  - c/w eliquis 5mg BID  - LE Duplex    #Hx of COPD  - currently on RA  - monitor    #HLD  #CAD s/p PCI  - c/w home statin    #Hx of Bipolar  - c/w home meds    #Hx of GERD  - PPI    #Hx of Gout    Patient med rec partially completed; please confirm other medications + dosages in am.    Misc   DVT: eliquis  GI: PPI  Diet: DASH  Activity: IAT  Dispo: PT [facility]

## 2024-10-25 NOTE — H&P ADULT - HISTORY OF PRESENT ILLNESS
Patient is a 64-year-old male, PMH of COPD not on home O2, HLD, CAD status post PCI, anemia, bipolar disorder, GERD, gout, RLE DVT on Eliquis, from [facility] living [facility] presenting for evaluation of difficulty ambulating.  Patient states he has chronic leg swelling, right worse than left. Last va duplex was one month ago outpatient and states no DVT at that time. Patient relates that he fell 1 week ago, he admits this was mechanical trip and fall while trying to get into his bed while using his walker.  Patient fell forward, no head trauma, LOC, nausea, vomiting. Denied any lightheadness or SOB prior to fall. Since fall he has been having increased difficulty ambulating.  Patient initially ambulates with a walker at baseline.     ED Vital Signs:  · BP Systolic	108 mm Hg  · BP Diastolic	 59 mm Hg  · Heart Rate	61 /min  · Respiration Rate (breaths/min)	16 /min  · Temp (F)	98.4 Degrees F  · SpO2 (%)	96 %  · O2 Delivery/Oxygen Delivery Method	nasal cannula  · Oxygen Therapy Flow (L/min)	2 L/min    Labs notable for mild anemia, eGFR ~50, elevated PT and INR,     CT Cerv Spine + Head: negative for acute findings  CT Chest Ab Pelvis w/ IV: No CT evidence of acute traumatic injury within the chest, abdomen or pelvis. No hip fractures.

## 2024-10-26 DIAGNOSIS — R09.89 OTHER SPECIFIED SYMPTOMS AND SIGNS INVOLVING THE CIRCULATORY AND RESPIRATORY SYSTEMS: ICD-10-CM

## 2024-10-26 LAB
A1C WITH ESTIMATED AVERAGE GLUCOSE RESULT: 5.9 % — HIGH (ref 4–5.6)
ALBUMIN SERPL ELPH-MCNC: 3.8 G/DL — SIGNIFICANT CHANGE UP (ref 3.5–5.2)
ALP SERPL-CCNC: 43 U/L — SIGNIFICANT CHANGE UP (ref 30–115)
ALT FLD-CCNC: 12 U/L — SIGNIFICANT CHANGE UP (ref 0–41)
ANION GAP SERPL CALC-SCNC: 12 MMOL/L — SIGNIFICANT CHANGE UP (ref 7–14)
APTT BLD: 35.5 SEC — SIGNIFICANT CHANGE UP (ref 27–39.2)
AST SERPL-CCNC: 14 U/L — SIGNIFICANT CHANGE UP (ref 0–41)
BASOPHILS # BLD AUTO: 0.03 K/UL — SIGNIFICANT CHANGE UP (ref 0–0.2)
BASOPHILS NFR BLD AUTO: 0.5 % — SIGNIFICANT CHANGE UP (ref 0–1)
BILIRUB SERPL-MCNC: 0.6 MG/DL — SIGNIFICANT CHANGE UP (ref 0.2–1.2)
BUN SERPL-MCNC: 18 MG/DL — SIGNIFICANT CHANGE UP (ref 10–20)
CALCIUM SERPL-MCNC: 9 MG/DL — SIGNIFICANT CHANGE UP (ref 8.4–10.5)
CHLORIDE SERPL-SCNC: 102 MMOL/L — SIGNIFICANT CHANGE UP (ref 98–110)
CO2 SERPL-SCNC: 26 MMOL/L — SIGNIFICANT CHANGE UP (ref 17–32)
CREAT SERPL-MCNC: 1.3 MG/DL — SIGNIFICANT CHANGE UP (ref 0.7–1.5)
EGFR: 61 ML/MIN/1.73M2 — SIGNIFICANT CHANGE UP
EOSINOPHIL # BLD AUTO: 0.1 K/UL — SIGNIFICANT CHANGE UP (ref 0–0.7)
EOSINOPHIL NFR BLD AUTO: 1.5 % — SIGNIFICANT CHANGE UP (ref 0–8)
ESTIMATED AVERAGE GLUCOSE: 123 MG/DL — HIGH (ref 68–114)
GLUCOSE SERPL-MCNC: 88 MG/DL — SIGNIFICANT CHANGE UP (ref 70–99)
HCT VFR BLD CALC: 43.1 % — SIGNIFICANT CHANGE UP (ref 42–52)
HGB BLD-MCNC: 13.6 G/DL — LOW (ref 14–18)
IMM GRANULOCYTES NFR BLD AUTO: 0.3 % — SIGNIFICANT CHANGE UP (ref 0.1–0.3)
INR BLD: 1.5 RATIO — HIGH (ref 0.65–1.3)
LYMPHOCYTES # BLD AUTO: 1.76 K/UL — SIGNIFICANT CHANGE UP (ref 1.2–3.4)
LYMPHOCYTES # BLD AUTO: 27.2 % — SIGNIFICANT CHANGE UP (ref 20.5–51.1)
MCHC RBC-ENTMCNC: 29.5 PG — SIGNIFICANT CHANGE UP (ref 27–31)
MCHC RBC-ENTMCNC: 31.6 G/DL — LOW (ref 32–37)
MCV RBC AUTO: 93.5 FL — SIGNIFICANT CHANGE UP (ref 80–94)
MONOCYTES # BLD AUTO: 0.7 K/UL — HIGH (ref 0.1–0.6)
MONOCYTES NFR BLD AUTO: 10.8 % — HIGH (ref 1.7–9.3)
NEUTROPHILS # BLD AUTO: 3.86 K/UL — SIGNIFICANT CHANGE UP (ref 1.4–6.5)
NEUTROPHILS NFR BLD AUTO: 59.7 % — SIGNIFICANT CHANGE UP (ref 42.2–75.2)
NRBC # BLD: 0 /100 WBCS — SIGNIFICANT CHANGE UP (ref 0–0)
PLATELET # BLD AUTO: 161 K/UL — SIGNIFICANT CHANGE UP (ref 130–400)
PMV BLD: 11.6 FL — HIGH (ref 7.4–10.4)
POTASSIUM SERPL-MCNC: 4.1 MMOL/L — SIGNIFICANT CHANGE UP (ref 3.5–5)
POTASSIUM SERPL-SCNC: 4.1 MMOL/L — SIGNIFICANT CHANGE UP (ref 3.5–5)
PROT SERPL-MCNC: 6.5 G/DL — SIGNIFICANT CHANGE UP (ref 6–8)
PROTHROM AB SERPL-ACNC: 17.2 SEC — HIGH (ref 9.95–12.87)
RBC # BLD: 4.61 M/UL — LOW (ref 4.7–6.1)
RBC # FLD: 13.9 % — SIGNIFICANT CHANGE UP (ref 11.5–14.5)
SODIUM SERPL-SCNC: 140 MMOL/L — SIGNIFICANT CHANGE UP (ref 135–146)
WBC # BLD: 6.47 K/UL — SIGNIFICANT CHANGE UP (ref 4.8–10.8)
WBC # FLD AUTO: 6.47 K/UL — SIGNIFICANT CHANGE UP (ref 4.8–10.8)

## 2024-10-26 PROCEDURE — 93970 EXTREMITY STUDY: CPT | Mod: 26

## 2024-10-26 PROCEDURE — 99232 SBSQ HOSP IP/OBS MODERATE 35: CPT

## 2024-10-26 RX ORDER — MAGNESIUM, ALUMINUM HYDROXIDE 200-200 MG
30 TABLET,CHEWABLE ORAL EVERY 4 HOURS
Refills: 0 | Status: DISCONTINUED | OUTPATIENT
Start: 2024-10-26 | End: 2024-10-27

## 2024-10-26 RX ORDER — TAMSULOSIN HCL 0.4 MG
0.4 CAPSULE ORAL AT BEDTIME
Refills: 0 | Status: DISCONTINUED | OUTPATIENT
Start: 2024-10-26 | End: 2024-10-27

## 2024-10-26 RX ORDER — CLONAZEPAM 1 MG
1 TABLET ORAL
Refills: 0 | Status: DISCONTINUED | OUTPATIENT
Start: 2024-10-26 | End: 2024-10-27

## 2024-10-26 RX ORDER — PANTOPRAZOLE SODIUM 40 MG/1
40 TABLET, DELAYED RELEASE ORAL
Refills: 0 | Status: DISCONTINUED | OUTPATIENT
Start: 2024-10-26 | End: 2024-10-27

## 2024-10-26 RX ORDER — APIXABAN 5 MG/1
1 TABLET, FILM COATED ORAL
Refills: 0 | DISCHARGE

## 2024-10-26 RX ORDER — METOPROLOL TARTRATE 50 MG
50 TABLET ORAL DAILY
Refills: 0 | Status: DISCONTINUED | OUTPATIENT
Start: 2024-10-26 | End: 2024-10-27

## 2024-10-26 RX ORDER — OXYBUTYNIN CHLORIDE 5 MG/1
5 TABLET ORAL
Refills: 0 | Status: DISCONTINUED | OUTPATIENT
Start: 2024-10-26 | End: 2024-10-27

## 2024-10-26 RX ORDER — ENOXAPARIN SODIUM 40MG/0.4ML
40 SYRINGE (ML) SUBCUTANEOUS EVERY 24 HOURS
Refills: 0 | Status: DISCONTINUED | OUTPATIENT
Start: 2024-10-26 | End: 2024-10-26

## 2024-10-26 RX ORDER — FINASTERIDE 5 MG/1
5 TABLET, FILM COATED ORAL DAILY
Refills: 0 | Status: DISCONTINUED | OUTPATIENT
Start: 2024-10-26 | End: 2024-10-27

## 2024-10-26 RX ORDER — PREGABALIN 150 MG/1
25 CAPSULE ORAL
Refills: 0 | Status: DISCONTINUED | OUTPATIENT
Start: 2024-10-26 | End: 2024-10-27

## 2024-10-26 RX ORDER — ONDANSETRON HYDROCHLORIDE 2 MG/ML
4 INJECTION, SOLUTION INTRAMUSCULAR; INTRAVENOUS EVERY 8 HOURS
Refills: 0 | Status: DISCONTINUED | OUTPATIENT
Start: 2024-10-26 | End: 2024-10-27

## 2024-10-26 RX ORDER — MELATONIN 5 MG
3 TABLET ORAL AT BEDTIME
Refills: 0 | Status: DISCONTINUED | OUTPATIENT
Start: 2024-10-26 | End: 2024-10-27

## 2024-10-26 RX ORDER — RISPERIDONE 3 MG/1
2 TABLET, FILM COATED ORAL
Refills: 0 | Status: DISCONTINUED | OUTPATIENT
Start: 2024-10-26 | End: 2024-10-27

## 2024-10-26 RX ORDER — ACETAMINOPHEN 500 MG
650 TABLET ORAL EVERY 6 HOURS
Refills: 0 | Status: DISCONTINUED | OUTPATIENT
Start: 2024-10-26 | End: 2024-10-27

## 2024-10-26 RX ORDER — ASPIRIN/MAG CARB/ALUMINUM AMIN 325 MG
81 TABLET ORAL DAILY
Refills: 0 | Status: DISCONTINUED | OUTPATIENT
Start: 2024-10-26 | End: 2024-10-27

## 2024-10-26 RX ORDER — SENNA 187 MG
2 TABLET ORAL AT BEDTIME
Refills: 0 | Status: DISCONTINUED | OUTPATIENT
Start: 2024-10-26 | End: 2024-10-27

## 2024-10-26 RX ORDER — PAROXETINE HYDROCHLORIDE 20 MG/1
10 TABLET, FILM COATED ORAL DAILY
Refills: 0 | Status: DISCONTINUED | OUTPATIENT
Start: 2024-10-26 | End: 2024-10-27

## 2024-10-26 RX ORDER — APIXABAN 5 MG/1
5 TABLET, FILM COATED ORAL EVERY 12 HOURS
Refills: 0 | Status: DISCONTINUED | OUTPATIENT
Start: 2024-10-26 | End: 2024-10-27

## 2024-10-26 RX ADMIN — APIXABAN 5 MILLIGRAM(S): 5 TABLET, FILM COATED ORAL at 18:02

## 2024-10-26 RX ADMIN — Medication 0.4 MILLIGRAM(S): at 21:18

## 2024-10-26 RX ADMIN — APIXABAN 5 MILLIGRAM(S): 5 TABLET, FILM COATED ORAL at 05:32

## 2024-10-26 RX ADMIN — Medication 50 MILLIGRAM(S): at 05:32

## 2024-10-26 RX ADMIN — Medication 2 TABLET(S): at 21:18

## 2024-10-26 RX ADMIN — OXYBUTYNIN CHLORIDE 5 MILLIGRAM(S): 5 TABLET ORAL at 18:02

## 2024-10-26 RX ADMIN — RISPERIDONE 2 MILLIGRAM(S): 3 TABLET, FILM COATED ORAL at 12:48

## 2024-10-26 RX ADMIN — Medication 1 MILLIGRAM(S): at 18:02

## 2024-10-26 RX ADMIN — PANTOPRAZOLE SODIUM 40 MILLIGRAM(S): 40 TABLET, DELAYED RELEASE ORAL at 05:31

## 2024-10-26 RX ADMIN — Medication 20 MILLIGRAM(S): at 21:17

## 2024-10-26 RX ADMIN — Medication 81 MILLIGRAM(S): at 12:49

## 2024-10-26 RX ADMIN — RISPERIDONE 2 MILLIGRAM(S): 3 TABLET, FILM COATED ORAL at 05:32

## 2024-10-26 RX ADMIN — RISPERIDONE 2 MILLIGRAM(S): 3 TABLET, FILM COATED ORAL at 23:35

## 2024-10-26 RX ADMIN — PREGABALIN 25 MILLIGRAM(S): 150 CAPSULE ORAL at 05:32

## 2024-10-26 RX ADMIN — FINASTERIDE 5 MILLIGRAM(S): 5 TABLET, FILM COATED ORAL at 12:49

## 2024-10-26 RX ADMIN — OXYBUTYNIN CHLORIDE 5 MILLIGRAM(S): 5 TABLET ORAL at 05:32

## 2024-10-26 RX ADMIN — PREGABALIN 25 MILLIGRAM(S): 150 CAPSULE ORAL at 18:03

## 2024-10-26 RX ADMIN — PAROXETINE HYDROCHLORIDE 10 MILLIGRAM(S): 20 TABLET, FILM COATED ORAL at 12:49

## 2024-10-26 RX ADMIN — RISPERIDONE 2 MILLIGRAM(S): 3 TABLET, FILM COATED ORAL at 18:02

## 2024-10-26 RX ADMIN — Medication 1 MILLIGRAM(S): at 05:32

## 2024-10-27 VITALS
OXYGEN SATURATION: 92 % | HEART RATE: 76 BPM | DIASTOLIC BLOOD PRESSURE: 74 MMHG | TEMPERATURE: 98 F | RESPIRATION RATE: 18 BRPM | SYSTOLIC BLOOD PRESSURE: 125 MMHG

## 2024-10-27 PROCEDURE — 99238 HOSP IP/OBS DSCHRG MGMT 30/<: CPT

## 2024-10-27 RX ADMIN — OXYBUTYNIN CHLORIDE 5 MILLIGRAM(S): 5 TABLET ORAL at 17:04

## 2024-10-27 RX ADMIN — APIXABAN 5 MILLIGRAM(S): 5 TABLET, FILM COATED ORAL at 05:10

## 2024-10-27 RX ADMIN — PAROXETINE HYDROCHLORIDE 10 MILLIGRAM(S): 20 TABLET, FILM COATED ORAL at 11:40

## 2024-10-27 RX ADMIN — PREGABALIN 25 MILLIGRAM(S): 150 CAPSULE ORAL at 17:04

## 2024-10-27 RX ADMIN — OXYBUTYNIN CHLORIDE 5 MILLIGRAM(S): 5 TABLET ORAL at 05:11

## 2024-10-27 RX ADMIN — Medication 50 MILLIGRAM(S): at 05:10

## 2024-10-27 RX ADMIN — Medication 1 MILLIGRAM(S): at 17:05

## 2024-10-27 RX ADMIN — Medication 81 MILLIGRAM(S): at 11:40

## 2024-10-27 RX ADMIN — PREGABALIN 25 MILLIGRAM(S): 150 CAPSULE ORAL at 05:11

## 2024-10-27 RX ADMIN — Medication 1 MILLIGRAM(S): at 05:11

## 2024-10-27 RX ADMIN — FINASTERIDE 5 MILLIGRAM(S): 5 TABLET, FILM COATED ORAL at 11:41

## 2024-10-27 RX ADMIN — RISPERIDONE 2 MILLIGRAM(S): 3 TABLET, FILM COATED ORAL at 17:04

## 2024-10-27 RX ADMIN — APIXABAN 5 MILLIGRAM(S): 5 TABLET, FILM COATED ORAL at 17:04

## 2024-10-27 RX ADMIN — RISPERIDONE 2 MILLIGRAM(S): 3 TABLET, FILM COATED ORAL at 06:21

## 2024-10-27 RX ADMIN — RISPERIDONE 2 MILLIGRAM(S): 3 TABLET, FILM COATED ORAL at 11:40

## 2024-10-27 RX ADMIN — PANTOPRAZOLE SODIUM 40 MILLIGRAM(S): 40 TABLET, DELAYED RELEASE ORAL at 05:10

## 2024-10-27 NOTE — PROGRESS NOTE ADULT - ASSESSMENT
64-year-old male, past medical history of  COPD not on home O2, HLD, CAD status post PCI, anemia, bipolar disorder, GERD, gout, RLE DVT on Eliquis,  from [facility] presenting for evaluation of difficulty ambulating.     #Mechanical fall  #RLE edema with chronic venous stases   #hx of RLE dvt on eliquis   #COPD no exacerbation  #CAD s/p PCI  #Lung Opacities on CT -  Recommend radiographic follow-up.    Plan  PT eval   continue Eliquis       Dispo: STR  From [facility]  
  64-year-old male, past medical history of  COPD not on home O2, HLD, CAD status post PCI, anemia, bipolar disorder, GERD, gout, RLE DVT on Eliquis,  from [facility] presenting for evaluation of difficulty ambulating.     #Mechanical fall  #RLE edema with chronic venous stases   #hx of RLE dvt on eliquis   #COPD no exacerbation  #CAD s/p PCI  #Lung Opacities on CT -  Recommend radiographic follow-up.    Plan  PT eval   continue Eliquis       Dispo: STR  From [facility]

## 2024-10-27 NOTE — PHYSICAL THERAPY INITIAL EVALUATION ADULT - ADDITIONAL COMMENTS
Patient resides at assisted living facility. Patient needed assistance with ADLs prior to admission.

## 2024-10-27 NOTE — DISCHARGE NOTE NURSING/CASE MANAGEMENT/SOCIAL WORK - NSDCPEELIQUISDIET_GEN_ALL_CORE
Eat healthy foods you enjoy. Apixaban/Eliquis DOES NOT have a special diet. Limit your alcohol intake. full code

## 2024-10-27 NOTE — PROGRESS NOTE ADULT - SUBJECTIVE AND OBJECTIVE BOX
Pt seen and examined at bedside. Reports progressive weakness leading up to hospital stay. Ambulates with walker at baseline however now unable to walk.     VITAL SIGNS (Last 24 hrs):  T(C): 36.9 (10-26-24 @ 13:04), Max: 37 (10-25-24 @ 22:21)  HR: 61 (10-26-24 @ 13:04) (61 - 62)  BP: 120/73 (10-26-24 @ 13:04) (116/74 - 120/73)  RR: 18 (10-26-24 @ 13:04) (18 - 18)  SpO2: 95% (10-26-24 @ 13:04) (90% - 97%)  Wt(kg): --  Daily     Daily     I&O's Summary      PHYSICAL EXAM:  GENERAL: NAD, obese  HEAD:  Atraumatic, Normocephalic  EYES: EOMI, PERRLA, conjunctiva and sclera clear  NECK: Supple, No JVD  CHEST/LUNG: Clear to auscultation bilaterally; No wheeze  HEART: Regular rate and rhythm; No murmurs, rubs, or gallops  ABDOMEN: Soft, Nontender, Nondistended; Bowel sounds present  EXTREMITIES:  2+ Peripheral Pulses, No clubbing, cyanosis + b/l LE edema  PSYCH: AAOx3  NEUROLOGY: non-focal  SKIN: No rashes or lesions    Labs Reviewed  Spoke to patient in regards to abnormal labs.    CBC Full  -  ( 26 Oct 2024 06:28 )  WBC Count : 6.47 K/uL  Hemoglobin : 13.6 g/dL  Hematocrit : 43.1 %  Platelet Count - Automated : 161 K/uL  Mean Cell Volume : 93.5 fL  Mean Cell Hemoglobin : 29.5 pg  Mean Cell Hemoglobin Concentration : 31.6 g/dL  Auto Neutrophil # : 3.86 K/uL  Auto Lymphocyte # : 1.76 K/uL  Auto Monocyte # : 0.70 K/uL  Auto Eosinophil # : 0.10 K/uL  Auto Basophil # : 0.03 K/uL  Auto Neutrophil % : 59.7 %  Auto Lymphocyte % : 27.2 %  Auto Monocyte % : 10.8 %  Auto Eosinophil % : 1.5 %  Auto Basophil % : 0.5 %    BMP:    10-26 @ 06:28    Blood Urea Nitrogen - 18  Calcium - 9.0  Carbond Dioxide - 26  Chloride - 102  Creatinine - 1.3  Glucose - 88  Potassium - 4.1  Sodium - 140      Hemoglobin A1c -   PT/INR - ( 26 Oct 2024 06:28 )   PT: 17.20 sec;   INR: 1.50 ratio         PTT - ( 26 Oct 2024 06:28 )  PTT:35.5 sec  Urine Culture:            MEDICATIONS  (STANDING):  apixaban 5 milliGRAM(s) Oral every 12 hours  aspirin enteric coated 81 milliGRAM(s) Oral daily  atorvastatin 20 milliGRAM(s) Oral at bedtime  clonazePAM  Tablet 1 milliGRAM(s) Oral two times a day  finasteride 5 milliGRAM(s) Oral daily  metoprolol succinate ER 50 milliGRAM(s) Oral daily  oxybutynin 5 milliGRAM(s) Oral two times a day  pantoprazole    Tablet 40 milliGRAM(s) Oral before breakfast  PARoxetine 10 milliGRAM(s) Oral daily  pregabalin 25 milliGRAM(s) Oral two times a day  risperiDONE   Tablet 2 milliGRAM(s) Oral four times a day  senna 2 Tablet(s) Oral at bedtime  tamsulosin 0.4 milliGRAM(s) Oral at bedtime    MEDICATIONS  (PRN):  acetaminophen     Tablet .. 650 milliGRAM(s) Oral every 6 hours PRN Temp greater or equal to 38C (100.4F), Mild Pain (1 - 3)  aluminum hydroxide/magnesium hydroxide/simethicone Suspension 30 milliLiter(s) Oral every 4 hours PRN Dyspepsia  melatonin 3 milliGRAM(s) Oral at bedtime PRN Insomnia  ondansetron Injectable 4 milliGRAM(s) IV Push every 8 hours PRN Nausea and/or Vomiting  
SUBJECTIVE/OVERNIGHT EVENTS  Today is hospital day 2d. This morning patient was seen and examined at bedside, resting comfortably in bed. No acute or major events overnight.    HOSPITAL COURSE  Day 1:   Day 2:   Day 3:     Difficulty in walking, not elsewhere classified    Handoff    MEWS Score    COPD (chronic obstructive pulmonary disease)    HLD (hyperlipidemia)    CAD (coronary artery disease)    Inability to ambulate due to multiple joints    Suspected deep vein thrombosis (DVT)    No significant past surgical history    UNSTEADY GATE    90+    SysAdmin_VisitLink        MEDICATIONS  STANDING MEDICATIONS  apixaban 5 milliGRAM(s) Oral every 12 hours  aspirin enteric coated 81 milliGRAM(s) Oral daily  atorvastatin 20 milliGRAM(s) Oral at bedtime  clonazePAM  Tablet 1 milliGRAM(s) Oral two times a day  finasteride 5 milliGRAM(s) Oral daily  metoprolol succinate ER 50 milliGRAM(s) Oral daily  oxybutynin 5 milliGRAM(s) Oral two times a day  pantoprazole    Tablet 40 milliGRAM(s) Oral before breakfast  PARoxetine 10 milliGRAM(s) Oral daily  pregabalin 25 milliGRAM(s) Oral two times a day  risperiDONE   Tablet 2 milliGRAM(s) Oral four times a day  senna 2 Tablet(s) Oral at bedtime  tamsulosin 0.4 milliGRAM(s) Oral at bedtime    PRN MEDICATIONS  acetaminophen     Tablet .. 650 milliGRAM(s) Oral every 6 hours PRN  aluminum hydroxide/magnesium hydroxide/simethicone Suspension 30 milliLiter(s) Oral every 4 hours PRN  melatonin 3 milliGRAM(s) Oral at bedtime PRN  ondansetron Injectable 4 milliGRAM(s) IV Push every 8 hours PRN    VITALS  T(F): 98 (10-26-24 @ 20:10), Max: 98.4 (10-26-24 @ 13:04)  HR: 63 (10-26-24 @ 20:10) (61 - 63)  BP: 138/73 (10-26-24 @ 20:10) (120/70 - 138/73)  RR: 18 (10-26-24 @ 20:10) (18 - 18)  SpO2: 92% (10-26-24 @ 20:10) (90% - 95%)      LABS             13.6   6.47  )-----------( 161      ( 10-26-24 @ 06:28 )             43.1     140  |  102  |  18  -------------------------<  88   10-26-24 @ 06:28  4.1  |  26  |  1.3    Ca      9.0     10-26-24 @ 06:28    TPro  6.5  /  Alb  3.8  /  TBili  0.6  /  DBili  x   /  AST  14  /  ALT  12  /  AlkPhos  43  /  GGT  x     10-26-24 @ 06:28    PT/INR - ( 10-26-24 @ 06:28 )   PT: 17.20 sec[H];   INR: 1.50 ratio[H]  PTT - ( 10-26-24 @ 06:28 )  PTT:35.5 sec      Urinalysis Basic - ( 26 Oct 2024 06:28 )    Color: x / Appearance: x / SG: x / pH: x  Gluc: 88 mg/dL / Ketone: x  / Bili: x / Urobili: x   Blood: x / Protein: x / Nitrite: x   Leuk Esterase: x / RBC: x / WBC x   Sq Epi: x / Non Sq Epi: x / Bacteria: x

## 2024-10-27 NOTE — DISCHARGE NOTE NURSING/CASE MANAGEMENT/SOCIAL WORK - FINANCIAL ASSISTANCE
[facility] provides services at a reduced cost to those who are determined to be eligible through [facility]’s financial assistance program. Information regarding [facility]’s financial assistance program can be found by going to [email or web address] or by calling [phone].

## 2024-10-27 NOTE — PHYSICAL THERAPY INITIAL EVALUATION ADULT - GENERAL OBSERVATIONS, REHAB EVAL
Patient was seen from 10:50-11:15 for PT IE. Patient was rec'd in semi reclined in bed, +IVL, NAD, agreeable to participate in PT.

## 2024-10-27 NOTE — DISCHARGE NOTE NURSING/CASE MANAGEMENT/SOCIAL WORK - PATIENT PORTAL LINK FT
You can access the FollowMyHealth Patient Portal offered by [facility] by registering at the following website: [email or web address]. By joining [employer]’s FollowMyHealth portal, you will also be able to view your health information using other applications (apps) compatible with our system.

## 2024-10-27 NOTE — DISCHARGE NOTE PROVIDER - CARE PROVIDER_API CALL
Nikolai Mcintyre E  Pulmonary Disease  [address] [place] [id number]  Phone: [phone]  Fax: [phone]  Follow Up Time: 1 month

## 2024-10-27 NOTE — DISCHARGE NOTE PROVIDER - NSDCMRMEDTOKEN_GEN_ALL_CORE_FT
alendronate 70 mg oral tablet: 1 tab(s) orally once a week  Aspir 81 oral delayed release tablet: 1 tab(s) orally once a day  atorvastatin 20 mg oral tablet: 1 tab(s) orally once a day (at bedtime)  budesonide-formoterol 160 mcg-4.5 mcg/inh inhalation aerosol: 2 puff(s) inhaled 2 times a day  clonazePAM 1 mg oral tablet: 1 tab(s) orally 2 times a day  Eliquis 5 mg oral tablet: 1 tab(s) orally 2 times a day  Flomax 0.4 mg oral capsule: 1 cap(s) orally once a day  folic acid 1 mg oral tablet: 1 tab(s) orally once a day  Lyrica 25 mg oral capsule: 1 cap(s) orally 2 times a day  metoprolol succinate 50 mg oral capsule, extended release: 1 cap(s) orally once a day  Omega-3 oral capsule: 1 cap(s) orally once a day  oxybutynin 5 mg oral tablet: 1 tab(s) orally 2 times a day  oxycodone-acetaminophen 5 mg-325 mg oral tablet: 1 tab(s) orally 4 times a day as needed for  severe pain  PARoxetine 10 mg oral tablet: 1 tab(s) orally once a day  polyethylene glycol 3350 oral powder for reconstitution: 17 gram(s) orally once a day  Proscar 5 mg oral tablet: 1 tab(s) orally once a day  RisperDAL 2 mg oral tablet: 1 tab(s) orally 4 times a day  senna (sennosides) 8.6 mg oral tablet: 2 tab(s) orally once a day  TriCor 48 mg oral tablet: 1 tab(s) orally once a day

## 2024-10-27 NOTE — PHYSICAL THERAPY INITIAL EVALUATION ADULT - LEVEL OF INDEPENDENCE: SUPINE/SIT, REHAB EVAL
Patient was able to complete supine to sit 75% of the way, was unable to sit up at the edge of bed despite assistance from PT and HOB elevated/dependent (less than 25% patients effort)

## 2024-10-27 NOTE — DISCHARGE NOTE PROVIDER - HOSPITAL COURSE
64-year-old male, past medical history of  COPD not on home O2, HLD, CAD status post PCI, anemia, bipolar disorder, GERD, gout, RLE DVT on Eliquis,  from [facility] presenting for evaluation of difficulty ambulating.     #Mechanical fall  #RLE edema with chronic venous stases   #hx of RLE dvt on eliquis   #COPD no exacerbation  #CAD s/p PCI  #Lung Opacities on CT -  Recommend radiographic follow-up.    Plan  PT eval   continue Eliquis       Dispo: STR  From [facility]

## 2024-10-27 NOTE — DISCHARGE NOTE PROVIDER - NSDCCPCAREPLAN_GEN_ALL_CORE_FT
PRINCIPAL DISCHARGE DIAGNOSIS  Diagnosis: Inability to ambulate due to multiple joints  Assessment and Plan of Treatment: You were admitted for deconditioning. You will need short term rehab to regain your strength.      SECONDARY DISCHARGE DIAGNOSES  Diagnosis: Opacity of lung on imaging study  Assessment and Plan of Treatment: You had a CAT scan of your chest which showed lung opacities. Please follow up with your primary care doctor for repeat imaging in 6 weeks. We recommend radiographic follow-up.

## 2024-10-27 NOTE — DISCHARGE NOTE NURSING/CASE MANAGEMENT/SOCIAL WORK - NSDCPEFALRISK_GEN_ALL_CORE
For information on Fall & Injury Prevention, visit: [email or web address] OR  [email or web address] OR  https://www.cdc.gov/steadi/patient.html

## 2024-10-27 NOTE — PHYSICAL THERAPY INITIAL EVALUATION ADULT - PERTINENT HX OF CURRENT PROBLEM, REHAB EVAL
Patient is a 64-year-old male, PMH of COPD not on home O2, HLD, CAD status post PCI, anemia, bipolar disorder, GERD, gout, RLE DVT on Eliquis, from [facility] presenting for evaluation of difficulty ambulating.  Patient states he has chronic leg swelling, right worse than left. Last va duplex was one month ago outpatient and states no DVT at that time. Patient relates that he fell 1 week ago, he admits this was mechanical trip and fall while trying to get into his bed while using his walker.  Patient fell forward, no head trauma, LOC, nausea, vomiting. Denied any lightheadness or SOB prior to fall. Since fall he has been having increased difficulty ambulating.  Patient initially ambulates with a walker at baseline.

## 2024-11-06 DIAGNOSIS — R26.2 DIFFICULTY IN WALKING, NOT ELSEWHERE CLASSIFIED: ICD-10-CM

## 2024-11-06 DIAGNOSIS — Z79.84 LONG TERM (CURRENT) USE OF ORAL HYPOGLYCEMIC DRUGS: ICD-10-CM

## 2024-11-06 DIAGNOSIS — I87.8 OTHER SPECIFIED DISORDERS OF VEINS: ICD-10-CM

## 2024-11-06 DIAGNOSIS — E78.5 HYPERLIPIDEMIA, UNSPECIFIED: ICD-10-CM

## 2024-11-06 DIAGNOSIS — D64.9 ANEMIA, UNSPECIFIED: ICD-10-CM

## 2024-11-06 DIAGNOSIS — Z86.718 PERSONAL HISTORY OF OTHER VENOUS THROMBOSIS AND EMBOLISM: ICD-10-CM

## 2024-11-06 DIAGNOSIS — I25.10 ATHEROSCLEROTIC HEART DISEASE OF NATIVE CORONARY ARTERY WITHOUT ANGINA PECTORIS: ICD-10-CM

## 2024-11-06 DIAGNOSIS — K21.9 GASTRO-ESOPHAGEAL REFLUX DISEASE WITHOUT ESOPHAGITIS: ICD-10-CM

## 2024-11-06 DIAGNOSIS — J44.9 CHRONIC OBSTRUCTIVE PULMONARY DISEASE, UNSPECIFIED: ICD-10-CM

## 2024-11-06 DIAGNOSIS — M10.9 GOUT, UNSPECIFIED: ICD-10-CM

## 2024-11-06 DIAGNOSIS — Z79.01 LONG TERM (CURRENT) USE OF ANTICOAGULANTS: ICD-10-CM
